# Patient Record
Sex: FEMALE | Race: WHITE | Employment: OTHER | URBAN - METROPOLITAN AREA
[De-identification: names, ages, dates, MRNs, and addresses within clinical notes are randomized per-mention and may not be internally consistent; named-entity substitution may affect disease eponyms.]

---

## 2019-04-30 ENCOUNTER — OFFICE VISIT (OUTPATIENT)
Dept: INTERNAL MEDICINE CLINIC | Facility: CLINIC | Age: 69
End: 2019-04-30
Payer: MEDICARE

## 2019-04-30 VITALS
SYSTOLIC BLOOD PRESSURE: 118 MMHG | HEIGHT: 68 IN | WEIGHT: 139 LBS | DIASTOLIC BLOOD PRESSURE: 58 MMHG | TEMPERATURE: 98 F | HEART RATE: 87 BPM | BODY MASS INDEX: 21.07 KG/M2 | OXYGEN SATURATION: 98 %

## 2019-04-30 DIAGNOSIS — E03.9 HYPOTHYROIDISM (ACQUIRED): ICD-10-CM

## 2019-04-30 DIAGNOSIS — E10.9 TYPE 1 DIABETES MELLITUS WITHOUT COMPLICATION (HCC): ICD-10-CM

## 2019-04-30 DIAGNOSIS — M81.0 AGE-RELATED OSTEOPOROSIS WITHOUT CURRENT PATHOLOGICAL FRACTURE: ICD-10-CM

## 2019-04-30 DIAGNOSIS — Z76.89 ENCOUNTER TO ESTABLISH CARE: Primary | ICD-10-CM

## 2019-04-30 DIAGNOSIS — I10 HYPERTENSION, ESSENTIAL: ICD-10-CM

## 2019-04-30 DIAGNOSIS — D51.0 PERNICIOUS ANEMIA: ICD-10-CM

## 2019-04-30 DIAGNOSIS — Z85.3 HISTORY OF BILATERAL BREAST CANCER: ICD-10-CM

## 2019-04-30 PROCEDURE — G0463 HOSPITAL OUTPT CLINIC VISIT: HCPCS | Performed by: INTERNAL MEDICINE

## 2019-04-30 PROCEDURE — 99205 OFFICE O/P NEW HI 60 MIN: CPT | Performed by: INTERNAL MEDICINE

## 2019-04-30 RX ORDER — METOPROLOL SUCCINATE 25 MG
25 TABLET, EXTENDED RELEASE 24 HR ORAL DAILY
COMMUNITY
Start: 2019-04-05 | End: 2019-04-30

## 2019-04-30 RX ORDER — ANASTROZOLE 1 MG/1
1 TABLET ORAL DAILY
COMMUNITY
Start: 2019-03-01 | End: 2020-02-21

## 2019-04-30 RX ORDER — ERGOCALCIFEROL 1.25 MG/1
50000 CAPSULE ORAL WEEKLY
Qty: 12 CAPSULE | Refills: 3 | Status: SHIPPED | OUTPATIENT
Start: 2019-04-30 | End: 2020-03-10

## 2019-04-30 RX ORDER — PEN NEEDLE, DIABETIC 31 GX5/16"
NEEDLE, DISPOSABLE MISCELLANEOUS
COMMUNITY
Start: 2018-11-14

## 2019-04-30 RX ORDER — CYANOCOBALAMIN 1000 UG/ML
1000 INJECTION INTRAMUSCULAR; SUBCUTANEOUS
COMMUNITY
Start: 2019-04-18 | End: 2020-07-06

## 2019-04-30 RX ORDER — ATORVASTATIN CALCIUM 10 MG/1
10 TABLET, FILM COATED ORAL DAILY
Qty: 90 TABLET | Refills: 3 | Status: SHIPPED | OUTPATIENT
Start: 2019-04-30 | End: 2020-02-21 | Stop reason: ALTCHOICE

## 2019-04-30 RX ORDER — LEVOTHYROXINE SODIUM 0.1 MG/1
TABLET ORAL
Qty: 90 TABLET | Refills: 3 | Status: SHIPPED | OUTPATIENT
Start: 2019-04-30 | End: 2020-06-29

## 2019-04-30 RX ORDER — LEVOTHYROXINE SODIUM 100 MCG
TABLET ORAL
COMMUNITY
Start: 2019-02-05 | End: 2019-04-30

## 2019-04-30 RX ORDER — METOPROLOL SUCCINATE 25 MG/1
25 TABLET, EXTENDED RELEASE ORAL DAILY
Qty: 90 TABLET | Refills: 3 | Status: SHIPPED | OUTPATIENT
Start: 2019-04-30 | End: 2020-06-08

## 2019-04-30 RX ORDER — ATORVASTATIN CALCIUM 10 MG/1
1 TABLET, FILM COATED ORAL DAILY
COMMUNITY
Start: 2019-02-05 | End: 2019-04-30

## 2019-04-30 NOTE — PROGRESS NOTES
Donny Milan is a 79year old female who presents for     Establish care  Recently moved back to the area from [facility], [address]. Was in [address] for 20 yrs for work. Had lived in [place] for 30 yrs before then.   Her son, a nurse practicioner,lives in [place].   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 Takes vit D 63002 u weekly. Gets calcium in diet. Refer to Dr. Sandrine Saucedo. HTN--takes toprol xl 25 mg daily. Hypercholesterolemia--takes lipitor 10 mg daily. Checks lipids.      [facility] (SYNTHROID) 100 MCG Oral Tab One tablet 6 days a week Disp: 90 tablet Rfl: 3   atorvastatin 10 MG Oral Tab Take 1 tablet (10 mg total) by mouth daily. Disp: 90 tablet Rfl: 3   anastrozole 1 MG Oral Tab tab Take 1 tablet by mouth daily.  Disp:  Rfl:    cyano

## 2019-05-16 DIAGNOSIS — E10.9 TYPE 1 DIABETES MELLITUS WITHOUT COMPLICATION (HCC): Primary | ICD-10-CM

## 2019-05-16 NOTE — TELEPHONE ENCOUNTER
[facility] requesting a new script for Test Strips  For Medicare they require the testing not have a range it must be either 4 or 5 times  Also include NPI number.     Tasked to rx

## 2019-05-21 NOTE — TELEPHONE ENCOUNTER
[facility] [place] [facility] again, when pharmacy enters for billing it shows that Medicare will only cover testing 3x per day  Please send new RX with these directions  Tasked to RX

## 2019-05-21 NOTE — TELEPHONE ENCOUNTER
I spoke with patient and she says that she is testing her blood sugar 4-5 times per day. She says she has enough glucose testing strips to last her a few days.  I spoke with pharmacist at [facility] and she says that medicare will not cover testing more than 3 lisa

## 2019-05-21 NOTE — TELEPHONE ENCOUNTER
To nursing. Tell pt Medicare won't cover test strips for more than 3 times a day even if on insulin. Not sure how I can do any changes to make medicare cover this. She will need to pay out of pocket for the other time of the day she is testing.        Trey Mejias

## 2019-05-21 NOTE — TELEPHONE ENCOUNTER
Pt is calling back she has additional questions regarding testing supplies   Please call pt [phone]    Tasked to nursing

## 2019-05-22 NOTE — TELEPHONE ENCOUNTER
Patient contacted and relayed 's message. Patient verbalized understanding but states she has secondary insurance who should cover the difference. Before she went on Medicare her insurance would cover those directions entirely.  I advised sid

## 2019-05-22 NOTE — TELEPHONE ENCOUNTER
Pt. Requests new Rx: for billing medicare B need ICD DX Code, specific qty & specific directions on Rx (**test 4 times daily**- Med part b only allows testing 1x/day if NIDDM. Max 4x/ day insulin dependent       One touch verio test strips fax.  # [id number]

## 2019-05-23 RX ORDER — BLOOD SUGAR DIAGNOSTIC
STRIP MISCELLANEOUS
Qty: 400 STRIP | Refills: 3 | Status: SHIPPED | OUTPATIENT
Start: 2019-05-23 | End: 2020-09-09

## 2019-05-28 NOTE — TELEPHONE ENCOUNTER
To [address] [address] with [facility] - they are suggesting that pt try another pharmacy since [facility] is choosing not to cover more than Medicare recommends altho Medicare will sometimes allow 4-5 times daily as below.   Pt will communicate with pt to investi

## 2019-07-05 ENCOUNTER — TELEPHONE (OUTPATIENT)
Dept: INTERNAL MEDICINE CLINIC | Facility: CLINIC | Age: 69
End: 2019-07-05

## 2019-07-10 NOTE — TELEPHONE ENCOUNTER
[facility] Detailed Order Form completed and faxed to [facility] @ [phone], conformation received. Original sent to scan.

## 2019-07-12 ENCOUNTER — TELEPHONE (OUTPATIENT)
Dept: INTERNAL MEDICINE CLINIC | Facility: CLINIC | Age: 69
End: 2019-07-12

## 2019-07-12 ENCOUNTER — LAB ENCOUNTER (OUTPATIENT)
Dept: LAB | Age: 69
End: 2019-07-12
Attending: INTERNAL MEDICINE
Payer: MEDICARE

## 2019-07-12 DIAGNOSIS — E10.9 TYPE 1 DIABETES MELLITUS WITHOUT COMPLICATION (HCC): ICD-10-CM

## 2019-07-12 DIAGNOSIS — M81.0 AGE-RELATED OSTEOPOROSIS WITHOUT CURRENT PATHOLOGICAL FRACTURE: ICD-10-CM

## 2019-07-12 LAB
25(OH)D3 SERPL-MCNC: 57.5 NG/ML (ref 30–100)
ALBUMIN SERPL-MCNC: 3.3 G/DL (ref 3.4–5)
ALBUMIN/GLOB SERPL: 0.8 {RATIO} (ref 1–2)
ALP LIVER SERPL-CCNC: 156 U/L (ref 55–142)
ALT SERPL-CCNC: 65 U/L (ref 13–56)
ANION GAP SERPL CALC-SCNC: 9 MMOL/L (ref 0–18)
AST SERPL-CCNC: 95 U/L (ref 15–37)
BASOPHILS # BLD AUTO: 0.05 X10(3) UL (ref 0–0.2)
BASOPHILS NFR BLD AUTO: 1.3 %
BILIRUB SERPL-MCNC: 1 MG/DL (ref 0.1–2)
BILIRUB UR QL: NEGATIVE
BUN BLD-MCNC: 6 MG/DL (ref 7–18)
BUN/CREAT SERPL: 7.8 (ref 10–20)
CALCIUM BLD-MCNC: 9.6 MG/DL (ref 8.5–10.1)
CHLORIDE SERPL-SCNC: 99 MMOL/L (ref 98–112)
CHOLEST SMN-MCNC: 164 MG/DL (ref ?–200)
CO2 SERPL-SCNC: 27 MMOL/L (ref 21–32)
COLOR UR: YELLOW
CREAT BLD-MCNC: 0.77 MG/DL (ref 0.55–1.02)
CREAT UR-SCNC: 158 MG/DL
DEPRECATED RDW RBC AUTO: 46 FL (ref 35.1–46.3)
EOSINOPHIL # BLD AUTO: 0.07 X10(3) UL (ref 0–0.7)
EOSINOPHIL NFR BLD AUTO: 1.9 %
ERYTHROCYTE [DISTWIDTH] IN BLOOD BY AUTOMATED COUNT: 12.4 % (ref 11–15)
EST. AVERAGE GLUCOSE BLD GHB EST-MCNC: 100 MG/DL (ref 68–126)
GLOBULIN PLAS-MCNC: 4.1 G/DL (ref 2.8–4.4)
GLUCOSE BLD-MCNC: 208 MG/DL (ref 70–99)
GLUCOSE UR-MCNC: NEGATIVE MG/DL
HBA1C MFR BLD HPLC: 5.1 % (ref ?–5.7)
HCT VFR BLD AUTO: 40.7 % (ref 35–48)
HDLC SERPL-MCNC: 91 MG/DL (ref 40–59)
HGB BLD-MCNC: 13.5 G/DL (ref 12–16)
HYALINE CASTS #/AREA URNS AUTO: 2 /LPF
IMM GRANULOCYTES # BLD AUTO: 0.01 X10(3) UL (ref 0–1)
IMM GRANULOCYTES NFR BLD: 0.3 %
LDLC SERPL CALC-MCNC: 48 MG/DL (ref ?–100)
LYMPHOCYTES # BLD AUTO: 0.67 X10(3) UL (ref 1–4)
LYMPHOCYTES NFR BLD AUTO: 17.9 %
M PROTEIN MFR SERPL ELPH: 7.4 G/DL (ref 6.4–8.2)
MCH RBC QN AUTO: 34.1 PG (ref 26–34)
MCHC RBC AUTO-ENTMCNC: 33.2 G/DL (ref 31–37)
MCV RBC AUTO: 102.8 FL (ref 80–100)
MICROALBUMIN UR-MCNC: 1.69 MG/DL
MICROALBUMIN/CREAT 24H UR-RTO: 10.7 UG/MG (ref ?–30)
MONOCYTES # BLD AUTO: 0.3 X10(3) UL (ref 0.1–1)
MONOCYTES NFR BLD AUTO: 8 %
NEUTROPHILS # BLD AUTO: 2.65 X10 (3) UL (ref 1.5–7.7)
NEUTROPHILS # BLD AUTO: 2.65 X10(3) UL (ref 1.5–7.7)
NEUTROPHILS NFR BLD AUTO: 70.6 %
NITRITE UR QL STRIP.AUTO: NEGATIVE
NONHDLC SERPL-MCNC: 73 MG/DL (ref ?–130)
OSMOLALITY SERPL CALC.SUM OF ELEC: 284 MOSM/KG (ref 275–295)
PATIENT FASTING: YES
PATIENT FASTING: YES
PH UR: 5 [PH] (ref 5–8)
PLATELET # BLD AUTO: 234 10(3)UL (ref 150–450)
POTASSIUM SERPL-SCNC: 4.4 MMOL/L (ref 3.5–5.1)
PROT UR-MCNC: NEGATIVE MG/DL
RBC # BLD AUTO: 3.96 X10(6)UL (ref 3.8–5.3)
RBC #/AREA URNS AUTO: 3 /HPF
SODIUM SERPL-SCNC: 135 MMOL/L (ref 136–145)
SP GR UR STRIP: 1.01 (ref 1–1.03)
TRIGL SERPL-MCNC: 127 MG/DL (ref 30–149)
TSI SER-ACNC: 1.06 MIU/ML (ref 0.36–3.74)
UROBILINOGEN UR STRIP-ACNC: <2
VIT C UR-MCNC: NEGATIVE MG/DL
VLDLC SERPL CALC-MCNC: 25 MG/DL (ref 0–30)
WBC # BLD AUTO: 3.8 X10(3) UL (ref 4–11)
WBC #/AREA URNS AUTO: 67 /HPF

## 2019-07-12 PROCEDURE — 36415 COLL VENOUS BLD VENIPUNCTURE: CPT

## 2019-07-12 PROCEDURE — 80053 COMPREHEN METABOLIC PANEL: CPT

## 2019-07-12 PROCEDURE — 87077 CULTURE AEROBIC IDENTIFY: CPT | Performed by: INTERNAL MEDICINE

## 2019-07-12 PROCEDURE — 87186 SC STD MICRODIL/AGAR DIL: CPT | Performed by: INTERNAL MEDICINE

## 2019-07-12 PROCEDURE — 82043 UR ALBUMIN QUANTITATIVE: CPT

## 2019-07-12 PROCEDURE — 80061 LIPID PANEL: CPT

## 2019-07-12 PROCEDURE — 83036 HEMOGLOBIN GLYCOSYLATED A1C: CPT

## 2019-07-12 PROCEDURE — 87086 URINE CULTURE/COLONY COUNT: CPT | Performed by: INTERNAL MEDICINE

## 2019-07-12 PROCEDURE — 82570 ASSAY OF URINE CREATININE: CPT

## 2019-07-12 PROCEDURE — 85025 COMPLETE CBC W/AUTO DIFF WBC: CPT

## 2019-07-12 PROCEDURE — 84443 ASSAY THYROID STIM HORMONE: CPT

## 2019-07-12 PROCEDURE — 81001 URINALYSIS AUTO W/SCOPE: CPT | Performed by: INTERNAL MEDICINE

## 2019-07-12 PROCEDURE — 82306 VITAMIN D 25 HYDROXY: CPT

## 2019-07-12 NOTE — TELEPHONE ENCOUNTER
I spoke with patient and she is not having any concerning urinary symptoms. Urine culture is pending. Dr. Reji Cho, ok to wait for Dr. Silvano Ortiz?

## 2019-07-17 ENCOUNTER — TELEPHONE (OUTPATIENT)
Dept: INTERNAL MEDICINE CLINIC | Facility: CLINIC | Age: 69
End: 2019-07-17

## 2019-07-17 NOTE — TELEPHONE ENCOUNTER
To nursing, please tell patient lab tests done on 7/12/19 show   1) she has a urinary tract infection. Rx Macrobid 100 mg twice daily #14.    2) elevated liver enzymes similar to what she had in [place] lab on 9/28/18.   Please hold the atorvastatin for

## 2019-07-18 RX ORDER — NITROFURANTOIN 25; 75 MG/1; MG/1
100 CAPSULE ORAL 2 TIMES DAILY
Qty: 14 CAPSULE | Refills: 0 | Status: SHIPPED | OUTPATIENT
Start: 2019-07-18 | End: 2019-07-30

## 2019-07-18 NOTE — TELEPHONE ENCOUNTER
macrobid eRX sent to [facility]. Spoke to patient and notified her that medication was sent. She verbalized understanding.

## 2019-07-18 NOTE — TELEPHONE ENCOUNTER
Called patient and relayed Dr Hong Cee message to her. Patient did verbalize understanding. She does not use a local pharmacy because she does not have a car.   She will speak with her daughter to find out at what pharmacy she could  the medicin

## 2019-07-18 NOTE — TELEPHONE ENCOUNTER
Pt is calling back to give us the pharmacy she would like to use:    Pharmacy: [facility]  Address: [address] [place] [address], [place] [address]  Phone # [phone]

## 2019-07-25 ENCOUNTER — OFFICE VISIT (OUTPATIENT)
Dept: ENDOCRINOLOGY CLINIC | Facility: CLINIC | Age: 69
End: 2019-07-25
Payer: MEDICARE

## 2019-07-25 VITALS
HEART RATE: 82 BPM | WEIGHT: 135 LBS | SYSTOLIC BLOOD PRESSURE: 126 MMHG | DIASTOLIC BLOOD PRESSURE: 75 MMHG | BODY MASS INDEX: 21 KG/M2

## 2019-07-25 DIAGNOSIS — M81.0 AGE-RELATED OSTEOPOROSIS WITHOUT CURRENT PATHOLOGICAL FRACTURE: ICD-10-CM

## 2019-07-25 DIAGNOSIS — E10.9 CONTROLLED DIABETES MELLITUS TYPE 1 WITHOUT COMPLICATIONS (HCC): Primary | ICD-10-CM

## 2019-07-25 PROCEDURE — 99204 OFFICE O/P NEW MOD 45 MIN: CPT | Performed by: INTERNAL MEDICINE

## 2019-07-25 NOTE — PROGRESS NOTES
Name: Hussain Cosme  Date: 7/25/2019    Referring Physician: No ref. provider found    HISTORY OF PRESENT ILLNESS   Hussain Cosme is a 79year old female who presents for diabetes mellitus type 1 diagnosed at age 17.      Prior HbA, C or glycohemoglobin wer by mouth 2 (two) times daily. , Disp: 14 capsule, Rfl: 0  •  Glucose Blood (ONETOUCH VERIO) In Vitro Strip, Test blood sugar 4 times daily. , Disp: 400 strip, Rfl: 3  •  anastrozole 1 MG Oral Tab tab, Take 1 tablet by mouth daily. , Disp: , Rfl:   •  cyanocob 2017    bilat mastectomy, chemo, radiation to both breasts in [place] -Dr Iram Still.     • Hypercholesterolemia    • Hypertension    • Hypothyroidism age 14    hashimoto's   • Osteoarthritis    • Osteoporosis 2005   • Pernicious anemia 1980s    self cardiovascular disease  -Discussed importance of SBGM  -Discussed importance of low CHO diet, recommend 45gm per meal or 135gm per day  -Provided patient education materials  -Concerned about risk of hypoglycemia particularly given low A1c but patient stat

## 2019-07-30 ENCOUNTER — OFFICE VISIT (OUTPATIENT)
Dept: INTERNAL MEDICINE CLINIC | Facility: CLINIC | Age: 69
End: 2019-07-30
Payer: MEDICARE

## 2019-07-30 ENCOUNTER — APPOINTMENT (OUTPATIENT)
Dept: LAB | Age: 69
End: 2019-07-30
Attending: INTERNAL MEDICINE
Payer: MEDICARE

## 2019-07-30 VITALS
TEMPERATURE: 99 F | SYSTOLIC BLOOD PRESSURE: 96 MMHG | HEART RATE: 89 BPM | WEIGHT: 135.25 LBS | DIASTOLIC BLOOD PRESSURE: 56 MMHG | HEIGHT: 68 IN | OXYGEN SATURATION: 98 % | BODY MASS INDEX: 20.5 KG/M2

## 2019-07-30 DIAGNOSIS — R79.89 ELEVATED LFTS: ICD-10-CM

## 2019-07-30 DIAGNOSIS — K76.9 LIVER DISEASE: ICD-10-CM

## 2019-07-30 DIAGNOSIS — E78.00 HYPERCHOLESTEROLEMIA: ICD-10-CM

## 2019-07-30 DIAGNOSIS — B96.89 UTI DUE TO KLEBSIELLA SPECIES: ICD-10-CM

## 2019-07-30 DIAGNOSIS — E10.9 TYPE 1 DIABETES MELLITUS WITHOUT COMPLICATION (HCC): ICD-10-CM

## 2019-07-30 DIAGNOSIS — I10 HYPERTENSION, ESSENTIAL: Primary | ICD-10-CM

## 2019-07-30 DIAGNOSIS — N39.0 UTI DUE TO KLEBSIELLA SPECIES: ICD-10-CM

## 2019-07-30 LAB
HBV SURFACE AG SER-ACNC: 0.41 [IU]/L
HBV SURFACE AG SERPL QL IA: NONREACTIVE
HCV AB SERPL QL IA: NONREACTIVE
IRON SATURATION: 47 % (ref 15–50)
IRON SERPL-MCNC: 95 UG/DL (ref 50–170)
TOTAL IRON BINDING CAPACITY: 204 UG/DL (ref 240–450)
TRANSFERRIN SERPL-MCNC: 137 MG/DL (ref 200–360)

## 2019-07-30 PROCEDURE — G0463 HOSPITAL OUTPT CLINIC VISIT: HCPCS | Performed by: INTERNAL MEDICINE

## 2019-07-30 PROCEDURE — 86256 FLUORESCENT ANTIBODY TITER: CPT

## 2019-07-30 PROCEDURE — 99214 OFFICE O/P EST MOD 30 MIN: CPT | Performed by: INTERNAL MEDICINE

## 2019-07-30 PROCEDURE — 82390 ASSAY OF CERULOPLASMIN: CPT

## 2019-07-30 PROCEDURE — 86255 FLUORESCENT ANTIBODY SCREEN: CPT

## 2019-07-30 PROCEDURE — 87340 HEPATITIS B SURFACE AG IA: CPT

## 2019-07-30 PROCEDURE — 86803 HEPATITIS C AB TEST: CPT

## 2019-07-30 PROCEDURE — 36415 COLL VENOUS BLD VENIPUNCTURE: CPT

## 2019-07-30 PROCEDURE — 83540 ASSAY OF IRON: CPT

## 2019-07-30 PROCEDURE — 84466 ASSAY OF TRANSFERRIN: CPT

## 2019-07-30 NOTE — PROGRESS NOTES
Leeann Palumbo is a 79year old female who presents for     3-month check    Type I diabetic since age 17. Saw Dr. Juan C Quiroga for initial visit 7/25/19. A1c 5.1%  She recommended continue Lantus 10-12 units daily & Humalog 2-7 units AC.    Has Dexcom G5--not ye Former Smoker        Packs/day: 0.25        Years: 46.00        Pack years: 11.5        Types: Cigarettes        Start date: 1972        Quit date: 2018        Years since quittin.6      Smokeless tobacco: Never Used    Alcohol use:  Yes fe/tibc, AMA, ASMA. Ceruloplasmin. recom see GI--Dr Sara Johnson and assoc. Type 1 diabetes mellitus w/o complication--takes Lantus 10-12 u/d and humalog 2-7 units AC. Seeing Dr Luis Ravi since 7/25/19. A1c 5.1 on 7/12/19.   Was referred at 4/30/19 visit to  sugar 4 times daily. Disp: 400 strip Rfl: 3   anastrozole 1 MG Oral Tab tab Take 1 tablet by mouth daily. Disp:  Rfl:    cyanocobalamin 1000 MCG/ML Injection Solution Inject 1,000 mcg into the skin every 14 (fourteen) days.  Disp:  Rfl:    BD PEN NEEDLE LEXUS

## 2019-07-31 LAB — CERULOPLASMIN SERPL-MCNC: 24.7 MG/DL (ref 20–60)

## 2019-08-02 LAB
MITOCHONDRIA AB TITR SER: <20 {TITER}
SMOOTH MUSCLE AB TITR SER: <20 {TITER}

## 2019-08-05 ENCOUNTER — TELEPHONE (OUTPATIENT)
Dept: ENDOCRINOLOGY CLINIC | Facility: CLINIC | Age: 69
End: 2019-08-05

## 2019-08-05 NOTE — TELEPHONE ENCOUNTER
Received SOB for prolia. PA is needed for secondary insurance. Pt is to owe 20% of cost.     Faxed over completed PA form to Dina Peterson. Waiting for reply.

## 2019-08-07 ENCOUNTER — TELEPHONE (OUTPATIENT)
Dept: INTERNAL MEDICINE CLINIC | Facility: CLINIC | Age: 69
End: 2019-08-07

## 2019-08-07 NOTE — TELEPHONE ENCOUNTER
I spoke with patient and relayed Dr. Brandon Neither message. She verbalized understanding. Invited patient to call the office if she has any questions or concerns.

## 2019-08-07 NOTE — TELEPHONE ENCOUNTER
To nursing,   please tell patient   lab done 7/30/19–results are okay in regards to additional lab done in view of her elevated liver function tests.     Please proceed as we discussed with ultrasound of the liver and also seeing Dr. Warren Tanner or associ

## 2019-08-27 NOTE — TELEPHONE ENCOUNTER
[employer] plan. Rep confirms that no PA is required with Medicare as primary insurance. Call ref # [id number].

## 2019-09-23 ENCOUNTER — TELEPHONE (OUTPATIENT)
Dept: ENDOCRINOLOGY CLINIC | Facility: CLINIC | Age: 69
End: 2019-09-23

## 2019-09-23 NOTE — TELEPHONE ENCOUNTER
pls call [employer] [facility] [phone] re pts order for supplies , sensor, strips     Pt states they sent over paperwork for Dr to fill out weeks ago - not sure what number they faxed it to

## 2019-10-01 ENCOUNTER — NURSE ONLY (OUTPATIENT)
Dept: ENDOCRINOLOGY CLINIC | Facility: CLINIC | Age: 69
End: 2019-10-01
Payer: MEDICARE

## 2019-10-01 DIAGNOSIS — M81.8 OTHER OSTEOPOROSIS, UNSPECIFIED PATHOLOGICAL FRACTURE PRESENCE: Primary | ICD-10-CM

## 2019-10-01 PROCEDURE — 96372 THER/PROPH/DIAG INJ SC/IM: CPT | Performed by: INTERNAL MEDICINE

## 2019-10-01 NOTE — PROGRESS NOTES
Patient presents to clinic today for Prolia (denosumab) 60 mg/mL in left upper arm. Patient tolerated well. Pt understands to return in 6 months for next injection.

## 2019-10-24 ENCOUNTER — OFFICE VISIT (OUTPATIENT)
Dept: ENDOCRINOLOGY CLINIC | Facility: CLINIC | Age: 69
End: 2019-10-24
Payer: MEDICARE

## 2019-10-24 VITALS
WEIGHT: 128.63 LBS | DIASTOLIC BLOOD PRESSURE: 62 MMHG | SYSTOLIC BLOOD PRESSURE: 110 MMHG | HEART RATE: 89 BPM | BODY MASS INDEX: 20 KG/M2

## 2019-10-24 DIAGNOSIS — M81.8 OTHER OSTEOPOROSIS WITHOUT CURRENT PATHOLOGICAL FRACTURE: ICD-10-CM

## 2019-10-24 DIAGNOSIS — E10.9 CONTROLLED DIABETES MELLITUS TYPE 1 WITHOUT COMPLICATIONS (HCC): Primary | ICD-10-CM

## 2019-10-24 DIAGNOSIS — E55.9 VITAMIN D DEFICIENCY: ICD-10-CM

## 2019-10-24 PROCEDURE — 82962 GLUCOSE BLOOD TEST: CPT | Performed by: INTERNAL MEDICINE

## 2019-10-24 PROCEDURE — 36416 COLLJ CAPILLARY BLOOD SPEC: CPT | Performed by: INTERNAL MEDICINE

## 2019-10-24 PROCEDURE — 83036 HEMOGLOBIN GLYCOSYLATED A1C: CPT | Performed by: INTERNAL MEDICINE

## 2019-10-24 PROCEDURE — 99214 OFFICE O/P EST MOD 30 MIN: CPT | Performed by: INTERNAL MEDICINE

## 2019-10-24 NOTE — PROGRESS NOTES
Name: Nuvia Waddell  Date: 10/24/2019    Referring Physician: No ref. provider found    HISTORY OF PRESENT ILLNESS   Nuvia Waddell is a 81year old female who presents for diabetes mellitus type 1 diagnosed at age 17.      Prior HbA, C or glycohemoglobin we Rfl:   •  Glucose Blood (ONETOUCH VERIO) In Vitro Strip, Test blood sugar 4 times daily. , Disp: 400 strip, Rfl: 3  •  anastrozole 1 MG Oral Tab tab, Take 1 tablet by mouth daily. , Disp: , Rfl:   •  cyanocobalamin 1000 MCG/ML Injection Solution, Inject 1,00 chemo, radiation to both breasts in [place] -Dr Aidee Araujo.     • Hypercholesterolemia    • Hypertension    • Hypothyroidism age 14    hashimoto's   • Osteoarthritis    • Osteoporosis 2005   • Pernicious anemia 1980s    self administers B12 shots twi disease  -Discussed importance of SBGM  -Discussed importance of low CHO diet, recommend 45gm per meal or 135gm per day  -Provided patient education materials  -Overall sugars are improved with CGM   -Continue Lantus 11 units SQ QHS  -Continue current dose

## 2019-10-25 ENCOUNTER — HOSPITAL ENCOUNTER (OUTPATIENT)
Dept: ULTRASOUND IMAGING | Facility: HOSPITAL | Age: 69
Discharge: HOME | End: 2019-10-25
Attending: INTERNAL MEDICINE
Payer: MEDICARE

## 2019-10-25 DIAGNOSIS — R79.89 ELEVATED LFTS: ICD-10-CM

## 2019-10-25 PROCEDURE — 76705 ECHO EXAM OF ABDOMEN: CPT | Performed by: INTERNAL MEDICINE

## 2019-10-29 ENCOUNTER — TELEPHONE (OUTPATIENT)
Dept: INTERNAL MEDICINE CLINIC | Facility: CLINIC | Age: 69
End: 2019-10-29

## 2019-10-29 NOTE — TELEPHONE ENCOUNTER
To nursing, please tell patient ultrasound liver 10/25/19 shows fatty liver and gallstones. Please keep appointment with me tomorrow 10/30/19 and will discuss further. Thanks.     Note to self–US liver 10/25/19 hepatic steatosis, gallstones, 6 mm angie

## 2019-10-29 NOTE — TELEPHONE ENCOUNTER
Spoke to patient and relayed MD message, she verbalizes understanding and agrees to keep appt for tomorrow.

## 2019-10-30 ENCOUNTER — OFFICE VISIT (OUTPATIENT)
Dept: INTERNAL MEDICINE CLINIC | Facility: CLINIC | Age: 69
End: 2019-10-30
Payer: MEDICARE

## 2019-10-30 VITALS
DIASTOLIC BLOOD PRESSURE: 66 MMHG | TEMPERATURE: 98 F | HEART RATE: 92 BPM | WEIGHT: 125 LBS | HEIGHT: 68 IN | BODY MASS INDEX: 18.94 KG/M2 | SYSTOLIC BLOOD PRESSURE: 126 MMHG

## 2019-10-30 DIAGNOSIS — I10 HYPERTENSION, ESSENTIAL: Primary | ICD-10-CM

## 2019-10-30 DIAGNOSIS — K80.20 GALLSTONES: ICD-10-CM

## 2019-10-30 DIAGNOSIS — R79.89 ELEVATED LFTS: ICD-10-CM

## 2019-10-30 DIAGNOSIS — E78.00 HYPERCHOLESTEROLEMIA: ICD-10-CM

## 2019-10-30 DIAGNOSIS — E10.9 TYPE 1 DIABETES MELLITUS WITHOUT COMPLICATION (HCC): ICD-10-CM

## 2019-10-30 DIAGNOSIS — K76.0 FATTY LIVER: ICD-10-CM

## 2019-10-30 PROCEDURE — G0009 ADMIN PNEUMOCOCCAL VACCINE: HCPCS | Performed by: INTERNAL MEDICINE

## 2019-10-30 PROCEDURE — G0008 ADMIN INFLUENZA VIRUS VAC: HCPCS | Performed by: INTERNAL MEDICINE

## 2019-10-30 PROCEDURE — 99214 OFFICE O/P EST MOD 30 MIN: CPT | Performed by: INTERNAL MEDICINE

## 2019-10-30 PROCEDURE — G0463 HOSPITAL OUTPT CLINIC VISIT: HCPCS | Performed by: INTERNAL MEDICINE

## 2019-10-30 PROCEDURE — 90662 IIV NO PRSV INCREASED AG IM: CPT | Performed by: INTERNAL MEDICINE

## 2019-10-30 PROCEDURE — 90670 PCV13 VACCINE IM: CPT | Performed by: INTERNAL MEDICINE

## 2019-10-30 NOTE — PROGRESS NOTES
Nuvia Waddell is a 81year old female who presents for     3-month check    Type I diabetic - Saw Dr. Escobar Mendez for f/u 10/24/19. A1c 6.0%  continue Lantus 10-12 units daily & Humalog 4-6 units AC. Using Dexcom G5 since late 8/2019.      HTN- toprol xl 25 mg d  early [de-identified].    • Other (1 brother, 1 son, 2 daughters) Other       Social History:   Social History    Tobacco Use      Smoking status: Former Smoker        Packs/day: 0.25        Years: 46.00        Pack years: 11.5        Types: Cigarettes restarting lipitor. Elevated LFTs-fatty liver--gallstones asymptomatc  -alt 65 ast 95 Alk ph156 on 7/12/19. [place] 9/28/18–similar elev LFTs and per pt for 10 yrs before.    7/30/19–HCV, hep Bs Ag, FE/TIBC, AMA, anti-smooth musc Ab, ceruloplasmin-o minute visit and greater than 50% of the time was spent counseling the patient and/or coordinating care. Her son, a nurse practicioner,lives in [facility]; Future  - COMP METABOLIC PANEL (14);  Future        Insulin Glargine (LANTUS SOLO

## 2019-11-07 ENCOUNTER — TELEPHONE (OUTPATIENT)
Dept: ENDOCRINOLOGY CLINIC | Facility: CLINIC | Age: 69
End: 2019-11-07

## 2019-11-07 NOTE — TELEPHONE ENCOUNTER
LOV 10/24/19. Notes indicate to continue lantus 11 units at HS. Spoke with pt who reports she uses up to 13 units per night and would like rx written this way. SH please advise on directions and quantity.

## 2019-11-07 NOTE — TELEPHONE ENCOUNTER
Patient requesting 90 days supply for Lantus insulin. [facility] states based on dose she only needs one box. Please call to discuss. Thank you.     Current Outpatient Medications   Medication Sig Dispense Refill   • Insulin Glargine (LANTUS SOLOSTA

## 2019-11-14 ENCOUNTER — TELEPHONE (OUTPATIENT)
Dept: ENDOCRINOLOGY CLINIC | Facility: CLINIC | Age: 69
End: 2019-11-14

## 2019-11-14 NOTE — TELEPHONE ENCOUNTER
[facility] has questions regarding Lantus insulin instructions. Please call with ref# [id number]. Thank you.

## 2020-02-21 ENCOUNTER — OFFICE VISIT (OUTPATIENT)
Dept: HEMATOLOGY/ONCOLOGY | Facility: HOSPITAL | Age: 70
End: 2020-02-21
Attending: INTERNAL MEDICINE
Payer: MEDICARE

## 2020-02-21 VITALS
DIASTOLIC BLOOD PRESSURE: 65 MMHG | HEART RATE: 82 BPM | HEIGHT: 68 IN | RESPIRATION RATE: 16 BRPM | WEIGHT: 139 LBS | BODY MASS INDEX: 21.07 KG/M2 | SYSTOLIC BLOOD PRESSURE: 137 MMHG | OXYGEN SATURATION: 97 % | TEMPERATURE: 98 F

## 2020-02-21 DIAGNOSIS — Z78.0 ASYMPTOMATIC MENOPAUSAL STATE: ICD-10-CM

## 2020-02-21 DIAGNOSIS — C50.212 MALIGNANT NEOPLASM OF UPPER-INNER QUADRANT OF LEFT BREAST IN FEMALE, ESTROGEN RECEPTOR POSITIVE (HCC): Primary | ICD-10-CM

## 2020-02-21 DIAGNOSIS — M85.80 OSTEOPENIA DUE TO CANCER THERAPY: ICD-10-CM

## 2020-02-21 DIAGNOSIS — Z17.0 MALIGNANT NEOPLASM OF UPPER-INNER QUADRANT OF LEFT BREAST IN FEMALE, ESTROGEN RECEPTOR POSITIVE (HCC): Primary | ICD-10-CM

## 2020-02-21 PROCEDURE — 99204 OFFICE O/P NEW MOD 45 MIN: CPT | Performed by: INTERNAL MEDICINE

## 2020-02-21 RX ORDER — ANASTROZOLE 1 MG/1
1 TABLET ORAL DAILY
Qty: 90 TABLET | Refills: 3 | Status: ON HOLD | OUTPATIENT
Start: 2020-02-21 | End: 2020-09-14

## 2020-02-21 NOTE — PROGRESS NOTES
JESSE Streeter is a 81year old female here to establish care for Malignant neoplasm of upper-inner quadrant of left breast in female, estrogen receptor positive (hcc)  (primary encounter diagnosis)    Her medical oncology records from her cancer ca regular follow-ups with her oncologist in [place] and has been followed closely with DEXA scans due to osteopenia, when he has had some fractures due to falls.   The patient was also started on Zometa due to her bone density issues, as well as per note fro gait problem (uses walker, has had falls.) and neck pain. Negative for back pain. Skin: Negative for rash. Dry skin. Neurological: Positive for gait problem (uses walker, has had falls.) and numbness. Negative for dizziness and headaches.    Hema anemia 1980s    self administers B12 shots twice a month per Dr Evins Lefort at [facility]   • Type 1 diabetes [facility]) age 17     Past Surgical History:   Procedure Laterality Date   • ANKLE FRACTURE SURGERY Right 08/2018    in [place]. (plate placed).  Dr Naldo [place] regions.   Psych/Depression: nl        ASSESSMENT/PLAN:   Malignant neoplasm of upper-inner quadrant of left breast in female, estrogen receptor positive (hcc)  (primary encounter diagnosis)    Cancer Staging  Malignant neoplasm of upper-inner quadrant of l 8/21/2020) for surveillance, monitoring medication, follow-up. No orders of the defined types were placed in this encounter. Results From Past 48 Hours:  No results found for this or any previous visit (from the past 48 hour(s)).       Imaging & R

## 2020-03-02 ENCOUNTER — OFFICE VISIT (OUTPATIENT)
Dept: INTERNAL MEDICINE CLINIC | Facility: CLINIC | Age: 70
End: 2020-03-02
Payer: MEDICARE

## 2020-03-02 VITALS
WEIGHT: 140 LBS | DIASTOLIC BLOOD PRESSURE: 60 MMHG | HEIGHT: 68 IN | TEMPERATURE: 98 F | HEART RATE: 88 BPM | SYSTOLIC BLOOD PRESSURE: 110 MMHG | BODY MASS INDEX: 21.22 KG/M2

## 2020-03-02 DIAGNOSIS — K76.0 FATTY LIVER: ICD-10-CM

## 2020-03-02 DIAGNOSIS — I10 HYPERTENSION, ESSENTIAL: Primary | ICD-10-CM

## 2020-03-02 DIAGNOSIS — E10.9 TYPE 1 DIABETES MELLITUS WITHOUT COMPLICATION (HCC): ICD-10-CM

## 2020-03-02 DIAGNOSIS — R79.89 ELEVATED LFTS: ICD-10-CM

## 2020-03-02 DIAGNOSIS — E78.00 HYPERCHOLESTEROLEMIA: ICD-10-CM

## 2020-03-02 PROCEDURE — G0463 HOSPITAL OUTPT CLINIC VISIT: HCPCS | Performed by: INTERNAL MEDICINE

## 2020-03-02 PROCEDURE — 99214 OFFICE O/P EST MOD 30 MIN: CPT | Performed by: INTERNAL MEDICINE

## 2020-03-02 NOTE — PROGRESS NOTES
Nuvia Waddell is a 81year old female who presents for     4-month check    Type I diabetic - Saw Dr. Escobar Mendez for f/u 10/24/19. A1c 6.0%  continue Lantus 10-12 units daily & Humalog 4-6 units AC. Using Dexcom G5 since late 8/2019. \"my sugars are doing ok. \  age 82 CHF   • Colon Cancer Mother 66   • Other (parkinsons disease) Father          early [de-identified].    • Other (1 brother, 1 son, 2 daughters) Other       Social History:   Social History    Tobacco Use      Smoking status: Former Smoker        Packs/d AST 95 alk phos 156 on 7/12/19 lab. LDL cholesterol 48, HDL 91. Reminder to do lipids off lipitor (Lipid cmp orders still valid) and see what GI/ hepatology says before restarting lipitor.      Elevated LFTs-fatty liver--gallstones asymptomatic.  alt 65 anti-smooth musc Ab, ceruloplasmin-ok. Reminder to do lipid cmp.      Ret in 4 mo. Patient expresses understanding of above issues and plan.    This is a 25 minute visit and greater than 50% of the time was spent counseling the patient and/or coordin

## 2020-03-05 ENCOUNTER — TELEPHONE (OUTPATIENT)
Dept: ENDOCRINOLOGY CLINIC | Facility: CLINIC | Age: 70
End: 2020-03-05

## 2020-03-05 NOTE — TELEPHONE ENCOUNTER
Pt due for Prolia on/after 4/2/2020. Has a follow up scheduled on 4/27/2020. Labs are ordered per protocol. Prolia IV faxed to [employer]. Awaiting SOB.

## 2020-03-10 RX ORDER — ERGOCALCIFEROL 1.25 MG/1
CAPSULE ORAL
Qty: 12 CAPSULE | Refills: 3 | Status: SHIPPED | OUTPATIENT
Start: 2020-03-10 | End: 2021-01-21

## 2020-03-11 NOTE — TELEPHONE ENCOUNTER
Per chart review, last Prolia IV came back needing PA but when resent with Medicare as primary, no PA needed. Resubmitted Prolia IV through [employer], awaiting decision.

## 2020-04-21 NOTE — TELEPHONE ENCOUNTER
Pt called for refills on Dexcom Sensors and also Humalog/[facility]:     Current Outpatient Medications:     •  Insulin Lispro (HUMALOG KWIKPEN) 100 UNIT/ML Subcutaneous Solution Pen-injector, Inject 4-6 units before meals.  , Disp: 5 pen, Rfl: 3    Pl [Dear  ___] : Dear  [unfilled], [Consult Letter:] : I had the pleasure of evaluating your patient, [unfilled]. [Please see my note below.] : Please see my note below. [Consult Closing:] : Thank you very much for allowing me to participate in the care of this patient.  If you have any questions, please do not hesitate to contact me. [Sincerely,] : Sincerely, [FreeTextEntry3] : Yahir Joe M.D., Ph.D. DPN-N\par [facility]\par Neurology at [place]\par Medical Director of Stroke Services\par [facility]\par

## 2020-04-21 NOTE — TELEPHONE ENCOUNTER
Pended rx for provider. Dr. Malathi Smith please advise - on your paper schedule for 4/27 - there is no TeleHealth or Virtual Visit option marked next to patients name. Would you like to make patients appt a TH or VV?  Thanks

## 2020-04-22 RX ORDER — INSULIN LISPRO 100 [IU]/ML
INJECTION, SOLUTION INTRAVENOUS; SUBCUTANEOUS
Qty: 15 ML | Refills: 3 | Status: SHIPPED | OUTPATIENT
Start: 2020-04-22 | End: 2020-07-06

## 2020-04-22 RX ORDER — BLOOD-GLUCOSE SENSOR
EACH MISCELLANEOUS
Qty: 3 EACH | Refills: 3 | Status: SHIPPED | OUTPATIENT
Start: 2020-04-22 | End: 2020-10-07

## 2020-04-24 ENCOUNTER — TELEPHONE (OUTPATIENT)
Dept: ENDOCRINOLOGY CLINIC | Facility: CLINIC | Age: 70
End: 2020-04-24

## 2020-04-27 ENCOUNTER — VIRTUAL PHONE E/M (OUTPATIENT)
Dept: ENDOCRINOLOGY CLINIC | Facility: CLINIC | Age: 70
End: 2020-04-27
Payer: MEDICARE

## 2020-04-27 ENCOUNTER — TELEPHONE (OUTPATIENT)
Dept: ENDOCRINOLOGY CLINIC | Facility: CLINIC | Age: 70
End: 2020-04-27

## 2020-04-27 DIAGNOSIS — E10.9 CONTROLLED DIABETES MELLITUS TYPE 1 WITHOUT COMPLICATIONS (HCC): Primary | ICD-10-CM

## 2020-04-27 PROCEDURE — 99442 PHONE E/M BY PHYS 11-20 MIN: CPT | Performed by: INTERNAL MEDICINE

## 2020-04-27 NOTE — TELEPHONE ENCOUNTER
Please call [employer] to give refill for sensors. Thank you. Also send [employer] message to patient after you speak to [facility].

## 2020-04-27 NOTE — TELEPHONE ENCOUNTER
Called [facility], will fax request sheet. Once received information, will fax to [facility] so pt can get sensor refills.

## 2020-04-27 NOTE — TELEPHONE ENCOUNTER
Faxed requested information to [facility] [phone]. Called pt to notify to call [facility] to request refills.

## 2020-04-27 NOTE — PROGRESS NOTES
Virtual Telephone Check-In    [facility] verbally consents to a Virtual/Telephone Check-In visit on 04/27/20. Patient understands and accepts financial responsibility for any deductible, co-insurance and/or co-pays associated with this service.     Du 10/2019    Thyroid disease: Yes, taking LT4 100mcg PO daily 6 days per week. She is currently feeling well on medication. Normal energy level.        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

## 2020-06-08 RX ORDER — METOPROLOL SUCCINATE 25 MG/1
TABLET, EXTENDED RELEASE ORAL
Qty: 90 TABLET | Refills: 3 | Status: SHIPPED | OUTPATIENT
Start: 2020-06-08 | End: 2021-05-11

## 2020-06-29 RX ORDER — LEVOTHYROXINE SODIUM 0.1 MG/1
TABLET ORAL
Qty: 90 TABLET | Refills: 3 | Status: SHIPPED | OUTPATIENT
Start: 2020-06-29

## 2020-06-29 NOTE — TELEPHONE ENCOUNTER
Spoke to pt - reminder to have labs completed ;   She has OV scheduled next week    Refill request is for a maintenance medication and has met the criteria specified in the Ambulatory Medication Refill Standing Order for eligibility, visits, laboratory, giacomo

## 2020-07-01 ENCOUNTER — APPOINTMENT (OUTPATIENT)
Dept: LAB | Age: 70
End: 2020-07-01
Attending: INTERNAL MEDICINE
Payer: MEDICARE

## 2020-07-01 DIAGNOSIS — R79.89 ELEVATED LFTS: ICD-10-CM

## 2020-07-01 DIAGNOSIS — E10.9 CONTROLLED DIABETES MELLITUS TYPE 1 WITHOUT COMPLICATIONS (HCC): ICD-10-CM

## 2020-07-01 DIAGNOSIS — M81.8 OTHER OSTEOPOROSIS WITHOUT CURRENT PATHOLOGICAL FRACTURE: ICD-10-CM

## 2020-07-01 DIAGNOSIS — E55.9 VITAMIN D DEFICIENCY: ICD-10-CM

## 2020-07-01 DIAGNOSIS — E78.00 HYPERCHOLESTEROLEMIA: ICD-10-CM

## 2020-07-01 LAB
25(OH)D3 SERPL-MCNC: 55.3 NG/ML (ref 30–100)
ALBUMIN SERPL-MCNC: 3.6 G/DL (ref 3.4–5)
ALBUMIN/GLOB SERPL: 0.8 {RATIO} (ref 1–2)
ALP LIVER SERPL-CCNC: 132 U/L (ref 55–142)
ALT SERPL-CCNC: 77 U/L (ref 13–56)
ANION GAP SERPL CALC-SCNC: 11 MMOL/L (ref 0–18)
AST SERPL-CCNC: 115 U/L (ref 15–37)
BILIRUB SERPL-MCNC: 1.1 MG/DL (ref 0.1–2)
BUN BLD-MCNC: 9 MG/DL (ref 7–18)
BUN/CREAT SERPL: 10 (ref 10–20)
CALCIUM BLD-MCNC: 9.4 MG/DL (ref 8.5–10.1)
CHLORIDE SERPL-SCNC: 101 MMOL/L (ref 98–112)
CHOLEST SMN-MCNC: 216 MG/DL (ref ?–200)
CO2 SERPL-SCNC: 26 MMOL/L (ref 21–32)
CREAT BLD-MCNC: 0.9 MG/DL (ref 0.55–1.02)
EST. AVERAGE GLUCOSE BLD GHB EST-MCNC: 88 MG/DL (ref 68–126)
GLOBULIN PLAS-MCNC: 4.6 G/DL (ref 2.8–4.4)
GLUCOSE BLD-MCNC: 116 MG/DL (ref 70–99)
HBA1C MFR BLD HPLC: 4.7 % (ref ?–5.7)
HDLC SERPL-MCNC: 120 MG/DL (ref 40–59)
LDLC SERPL CALC-MCNC: 69 MG/DL (ref ?–100)
M PROTEIN MFR SERPL ELPH: 8.2 G/DL (ref 6.4–8.2)
NONHDLC SERPL-MCNC: 96 MG/DL (ref ?–130)
OSMOLALITY SERPL CALC.SUM OF ELEC: 286 MOSM/KG (ref 275–295)
PATIENT FASTING Y/N/NP: YES
PATIENT FASTING Y/N/NP: YES
POTASSIUM SERPL-SCNC: 3.7 MMOL/L (ref 3.5–5.1)
PTH-INTACT SERPL-MCNC: 68.5 PG/ML (ref 18.5–88)
SODIUM SERPL-SCNC: 138 MMOL/L (ref 136–145)
TRIGL SERPL-MCNC: 134 MG/DL (ref 30–149)
VLDLC SERPL CALC-MCNC: 27 MG/DL (ref 0–30)

## 2020-07-01 PROCEDURE — 80061 LIPID PANEL: CPT

## 2020-07-01 PROCEDURE — 80053 COMPREHEN METABOLIC PANEL: CPT

## 2020-07-01 PROCEDURE — 83036 HEMOGLOBIN GLYCOSYLATED A1C: CPT

## 2020-07-01 PROCEDURE — 84080 ASSAY ALKALINE PHOSPHATASES: CPT

## 2020-07-01 PROCEDURE — 36415 COLL VENOUS BLD VENIPUNCTURE: CPT

## 2020-07-01 PROCEDURE — 82306 VITAMIN D 25 HYDROXY: CPT

## 2020-07-01 PROCEDURE — 83970 ASSAY OF PARATHORMONE: CPT

## 2020-07-02 LAB — BONE SPECIFIC ALKALINE PHOSPHATASE: 11.9 UG/L

## 2020-07-06 ENCOUNTER — OFFICE VISIT (OUTPATIENT)
Dept: INTERNAL MEDICINE CLINIC | Facility: CLINIC | Age: 70
End: 2020-07-06
Payer: MEDICARE

## 2020-07-06 VITALS
DIASTOLIC BLOOD PRESSURE: 68 MMHG | TEMPERATURE: 98 F | HEART RATE: 90 BPM | SYSTOLIC BLOOD PRESSURE: 128 MMHG | OXYGEN SATURATION: 99 % | RESPIRATION RATE: 16 BRPM | WEIGHT: 141 LBS | BODY MASS INDEX: 21 KG/M2

## 2020-07-06 DIAGNOSIS — I10 HYPERTENSION, ESSENTIAL: Primary | ICD-10-CM

## 2020-07-06 DIAGNOSIS — K76.0 FATTY LIVER: ICD-10-CM

## 2020-07-06 DIAGNOSIS — E10.9 TYPE 1 DIABETES MELLITUS WITHOUT COMPLICATION (HCC): ICD-10-CM

## 2020-07-06 DIAGNOSIS — R79.89 ELEVATED LFTS: ICD-10-CM

## 2020-07-06 DIAGNOSIS — K80.20 GALLSTONES: ICD-10-CM

## 2020-07-06 DIAGNOSIS — E78.00 HYPERCHOLESTEROLEMIA: ICD-10-CM

## 2020-07-06 PROCEDURE — G0463 HOSPITAL OUTPT CLINIC VISIT: HCPCS | Performed by: INTERNAL MEDICINE

## 2020-07-06 PROCEDURE — 99214 OFFICE O/P EST MOD 30 MIN: CPT | Performed by: INTERNAL MEDICINE

## 2020-07-06 RX ORDER — CYANOCOBALAMIN 1000 UG/ML
1000 INJECTION INTRAMUSCULAR; SUBCUTANEOUS
Qty: 3 VIAL | Refills: 3 | Status: SHIPPED | OUTPATIENT
Start: 2020-07-06 | End: 2020-11-06

## 2020-07-06 RX ORDER — INSULIN LISPRO 100 [IU]/ML
INJECTION, SOLUTION INTRAVENOUS; SUBCUTANEOUS
Qty: 15 ML | Refills: 3 | COMMUNITY
Start: 2020-07-06

## 2020-08-16 ENCOUNTER — APPOINTMENT (OUTPATIENT)
Dept: GENERAL RADIOLOGY | Facility: HOSPITAL | Age: 70
End: 2020-08-16
Attending: PHYSICIAN ASSISTANT
Payer: MEDICARE

## 2020-08-16 ENCOUNTER — HOSPITAL ENCOUNTER (EMERGENCY)
Facility: HOSPITAL | Age: 70
Discharge: HOME | End: 2020-08-16
Attending: PHYSICIAN ASSISTANT
Payer: MEDICARE

## 2020-08-16 VITALS
TEMPERATURE: 98 F | BODY MASS INDEX: 22 KG/M2 | WEIGHT: 145 LBS | RESPIRATION RATE: 20 BRPM | DIASTOLIC BLOOD PRESSURE: 65 MMHG | SYSTOLIC BLOOD PRESSURE: 147 MMHG | OXYGEN SATURATION: 97 % | HEART RATE: 108 BPM

## 2020-08-16 DIAGNOSIS — S82.032A CLOSED DISPLACED TRANSVERSE FRACTURE OF LEFT PATELLA, INITIAL ENCOUNTER: Primary | ICD-10-CM

## 2020-08-16 DIAGNOSIS — R03.0 ELEVATED BLOOD PRESSURE READING: ICD-10-CM

## 2020-08-16 PROCEDURE — 99284 EMERGENCY DEPT VISIT MOD MDM: CPT

## 2020-08-16 PROCEDURE — 73560 X-RAY EXAM OF KNEE 1 OR 2: CPT | Performed by: PHYSICIAN ASSISTANT

## 2020-08-16 RX ORDER — TRAMADOL HYDROCHLORIDE 50 MG/1
50 TABLET ORAL EVERY 6 HOURS PRN
Qty: 12 TABLET | Refills: 0 | Status: SHIPPED | OUTPATIENT
Start: 2020-08-16 | End: 2020-09-08

## 2020-08-16 RX ORDER — TRAMADOL HYDROCHLORIDE 50 MG/1
50 TABLET ORAL ONCE
Status: COMPLETED | OUTPATIENT
Start: 2020-08-16 | End: 2020-08-16

## 2020-08-16 RX ORDER — TRAMADOL HYDROCHLORIDE 50 MG/1
50 TABLET ORAL EVERY 6 HOURS PRN
Qty: 12 TABLET | Refills: 0 | Status: SHIPPED | OUTPATIENT
Start: 2020-08-16 | End: 2020-09-09

## 2020-08-16 NOTE — ED INITIAL ASSESSMENT (HPI)
Pt arrived via EMS from home, reports mechanical fall while using walker. Hit left knee against floor. Denies dizziness. Denies head injury. Denies LOC. Denies blood thinners.

## 2020-08-17 ENCOUNTER — TELEPHONE (OUTPATIENT)
Dept: ORTHOPEDICS CLINIC | Facility: CLINIC | Age: 70
End: 2020-08-17

## 2020-08-17 NOTE — TELEPHONE ENCOUNTER
Pt has appt on 8/20 but asking to be seen sooner, pt was seen at [facility] ED yesterday for a patella fracture. Please advise thank you.

## 2020-08-17 NOTE — ED PROVIDER NOTES
Patient Seen in: [facility] AND [facility] Emergency Department    History   Patient presents with:  Fall    Stated Complaint:     HPI    HPI: Caitlyn Martinez is a 81year old female who presents with chief complaint of left knee pain. Onset 2 hours ago.   Patient KWIKPEN) 100 UNIT/ML Subcutaneous Solution Pen-injector,  Inject 2-5 units before meals. cyanocobalamin 1000 MCG/ML Injection Solution,  Inject 1 mL (1,000 mcg total) into the skin every 30 (thirty) days.    Levothyroxine Sodium 100 MCG Oral Tab,  TAKE 1 above.    PSFH elements reviewed from today and agreed except as otherwise stated in HPI.     Physical Exam     ED Triage Vitals [08/16/20 1900]   BP (!) 171/82   Pulse 106   Resp 24   Temp 98 °F (36.7 °C)   Temp src Oral   SpO2 98 %   O2 Device None (Room speech. Skin: Skin is normal to inspection. Warm and dry. No obvious bruising. No obvious rash.     Differential diagnosis to include fracture vs. Strain/sprain vs. contusion      ED Course   Labs Reviewed - No data to display  Patient fitted and knee i medications    traMADol HCl 50 MG Oral Tab  Take 1 tablet (50 mg total) by mouth every 6 (six) hours as needed.   Qty: 12 tablet Refills: 0

## 2020-08-17 NOTE — TELEPHONE ENCOUNTER
Pt notified and offered appt at 815am on 8/18 but she has to see if she can get transport. If she wants appt she will CB before the end of the day.

## 2020-08-17 NOTE — TELEPHONE ENCOUNTER
She will need surgery and it is ok to wait until 8/20. However, if patient wants to be seen earlier, I can see her tomorrow add-on for 0800.

## 2020-08-17 NOTE — ED NOTES
Pt was able to stand and ambulate with walker in room. C/o left knee pain, but was able to stand and ambulate with walker.  Pt states she does not feel comfortable with the walker that the hospital has but she has her own walker at home that she is more com

## 2020-08-17 NOTE — TELEPHONE ENCOUNTER
S/w pt and she states she was walking to bathroom with her rollator and her legs gave out and she injured left knee on 8/16/20.  Pt went to ER and had XR and put pt in knee immobilizer which she took off and can't back on so her son is coming over to help h

## 2020-08-18 ENCOUNTER — OFFICE VISIT (OUTPATIENT)
Dept: ORTHOPEDICS CLINIC | Facility: CLINIC | Age: 70
End: 2020-08-18
Payer: MEDICARE

## 2020-08-18 ENCOUNTER — TELEPHONE (OUTPATIENT)
Dept: INTERNAL MEDICINE CLINIC | Facility: CLINIC | Age: 70
End: 2020-08-18

## 2020-08-18 ENCOUNTER — TELEPHONE (OUTPATIENT)
Dept: ORTHOPEDICS CLINIC | Facility: CLINIC | Age: 70
End: 2020-08-18

## 2020-08-18 DIAGNOSIS — S82.032A DISPLACED TRANSVERSE FRACTURE OF LEFT PATELLA, INITIAL ENCOUNTER FOR CLOSED FRACTURE: Primary | ICD-10-CM

## 2020-08-18 PROCEDURE — G0463 HOSPITAL OUTPT CLINIC VISIT: HCPCS | Performed by: ORTHOPAEDIC SURGERY

## 2020-08-18 PROCEDURE — 99204 OFFICE O/P NEW MOD 45 MIN: CPT | Performed by: ORTHOPAEDIC SURGERY

## 2020-08-18 NOTE — H&P
NURSING INTAKE COMMENTS: Patient presents with:  Consult: referred by [facility] ED on 8/16/2020 after a fall injuring left knee, xray left knee in ED, 7/10 left knee pain, pt is taking tramadol for pain, pt denies previous injury       HPI: This 81year old • traMADol HCl 50 MG Oral Tab Take 1 tablet (50 mg total) by mouth every 6 (six) hours as needed. 12 tablet 0   • traMADol HCl 50 MG Oral Tab Take 1 tablet (50 mg total) by mouth every 6 (six) hours as needed.  12 tablet 0   • Insulin Lispro, 1 Unit Dial, years: 11.5        Types: Cigarettes        Start date: 1972        Quit date: 2018        Years since quittin.7      Smokeless tobacco: Never Used    Substance and Sexual Activity      Alcohol use:  Yes        Alcohol/week: 2.0 standard drink CONCLUSION:   Transverse, comminuted fracture of the mid patella with 2.5 cm of distraction. Significant anterior prepatellar swelling.      Dictated by (CST): Svetlana Giraldo MD on 8/16/2020 at 7:47 PM     Finalized by (CST): Svetlana Giraldo MD on 8/16/2020

## 2020-08-18 NOTE — TELEPHONE ENCOUNTER
To nursing, please call the nurse of patient's Ortho, Dr Lugenia Harada, and tell her pt declines to come to see me (her PCP) for pre-op eval. Ask her to let Dr Davion Marie know this. Thanks. This message also routed to Dr Davion Marie.

## 2020-08-18 NOTE — TELEPHONE ENCOUNTER
Spoke with patient briefly. She states she saw the surgeon today and was told pre-op clearance is preferred, but not necessary for procedure to be done on 8/20/20. Patient would like to opt out of another office visit at this time.  She states she took a me

## 2020-08-18 NOTE — TELEPHONE ENCOUNTER
Patient returning phone call below. Patient states she has no ride to and from the office and cannot walk. Patient stated that the surgical doctor informed her that he would complete the surgery with or without clearance from Dr Radha Pulido.

## 2020-08-18 NOTE — TELEPHONE ENCOUNTER
Dr. Roxie Rivera please see message. Also there is a TE in [address] with pcp today you can review.

## 2020-08-18 NOTE — H&P (VIEW-ONLY)
NURSING INTAKE COMMENTS: Patient presents with:  Consult: referred by [facility] ED on 8/16/2020 after a fall injuring left knee, xray left knee in ED, 7/10 left knee pain, pt is taking tramadol for pain, pt denies previous injury       HPI: This 81year old • traMADol HCl 50 MG Oral Tab Take 1 tablet (50 mg total) by mouth every 6 (six) hours as needed. 12 tablet 0   • traMADol HCl 50 MG Oral Tab Take 1 tablet (50 mg total) by mouth every 6 (six) hours as needed.  12 tablet 0   • Insulin Lispro, 1 Unit Dial, years: 11.5        Types: Cigarettes        Start date: 1972        Quit date: 2018        Years since quittin.7      Smokeless tobacco: Never Used    Substance and Sexual Activity      Alcohol use:  Yes        Alcohol/week: 2.0 standard drink CONCLUSION:   Transverse, comminuted fracture of the mid patella with 2.5 cm of distraction. Significant anterior prepatellar swelling.      Dictated by (CST): Elaine Moseley MD on 8/16/2020 at 7:47 PM     Finalized by (CST): Elaine Moseley MD on 8/16/2020

## 2020-08-18 NOTE — TELEPHONE ENCOUNTER
Called pt and she confirmed she will not be getting pre op clearance. She states it is too last minute and she has transport issues and Dr. Dee Aguirre told her he would take her to sx with or without clearance.    I informed pt that pcp informed us and

## 2020-08-18 NOTE — TELEPHONE ENCOUNTER
Left message with Estefany at Dr. Barba Phlegm office that patient declines pre-op exam with PCP. Estefany verbalized understanding and will relay message to Dr. Haylee Guzman nurse. Spoke to patient and relayed MD message. Patient verbalized understanding.

## 2020-08-18 NOTE — TELEPHONE ENCOUNTER
To --I rec'd a \"chat\" msg from Dr Amber Bauer (ortho) who reqst pre-op eval for patella fx surgery Thursday 8/20/20.    Pt needs an appt w me today (can use MD approval if needed) or tomorrow at the latest. Thanks for calling her to sched

## 2020-08-19 ENCOUNTER — MED REC SCAN ONLY (OUTPATIENT)
Dept: ORTHOPEDICS CLINIC | Facility: CLINIC | Age: 70
End: 2020-08-19

## 2020-08-19 NOTE — PAT NURSING NOTE
Per Earnest King at Dr. Damon Balloon office,  [place] ok to use non-operative foot for IV site as pt. Has history of bilateral mastectomies with lymph node removal but states also check with anesthesia.   Per Dr. Lara [address] anesthesia on call , ok to use non-operat

## 2020-08-20 ENCOUNTER — ANESTHESIA EVENT (OUTPATIENT)
Dept: SURGERY | Facility: HOSPITAL | Age: 70
End: 2020-08-20
Payer: MEDICARE

## 2020-08-20 ENCOUNTER — HOSPITAL ENCOUNTER (OUTPATIENT)
Facility: HOSPITAL | Age: 70
Discharge: NURSING, REHAB OR LONG-TERM CARE | End: 2020-08-24
Attending: ORTHOPAEDIC SURGERY | Admitting: ORTHOPAEDIC SURGERY
Payer: MEDICARE

## 2020-08-20 ENCOUNTER — APPOINTMENT (OUTPATIENT)
Dept: GENERAL RADIOLOGY | Facility: HOSPITAL | Age: 70
End: 2020-08-20
Attending: ORTHOPAEDIC SURGERY
Payer: MEDICARE

## 2020-08-20 ENCOUNTER — ANESTHESIA (OUTPATIENT)
Dept: SURGERY | Facility: HOSPITAL | Age: 70
End: 2020-08-20
Payer: MEDICARE

## 2020-08-20 DIAGNOSIS — Z01.818 PREOP TESTING: Primary | ICD-10-CM

## 2020-08-20 PROBLEM — Z85.3 HISTORY OF BREAST CANCER: Chronic | Status: ACTIVE | Noted: 2020-08-20

## 2020-08-20 PROBLEM — S82.002A LEFT PATELLA FRACTURE: Status: ACTIVE | Noted: 2020-08-20

## 2020-08-20 LAB
GLUCOSE BLDC GLUCOMTR-MCNC: 143 MG/DL (ref 70–99)
GLUCOSE BLDC GLUCOMTR-MCNC: 162 MG/DL (ref 70–99)
GLUCOSE BLDC GLUCOMTR-MCNC: 211 MG/DL (ref 70–99)
MRSA DNA SPEC QL NAA+PROBE: NEGATIVE
SARS-COV-2 RNA RESP QL NAA+PROBE: NOT DETECTED

## 2020-08-20 PROCEDURE — 99202 OFFICE O/P NEW SF 15 MIN: CPT | Performed by: HOSPITALIST

## 2020-08-20 PROCEDURE — 27524 TREAT KNEECAP FRACTURE: CPT | Performed by: ORTHOPAEDIC SURGERY

## 2020-08-20 PROCEDURE — 76000 FLUOROSCOPY <1 HR PHYS/QHP: CPT | Performed by: ORTHOPAEDIC SURGERY

## 2020-08-20 PROCEDURE — 0QSF04Z REPOSITION LEFT PATELLA WITH INTERNAL FIXATION DEVICE, OPEN APPROACH: ICD-10-PCS | Performed by: ORTHOPAEDIC SURGERY

## 2020-08-20 RX ORDER — CEFAZOLIN SODIUM/WATER 2 G/20 ML
2 SYRINGE (ML) INTRAVENOUS ONCE
Status: COMPLETED | OUTPATIENT
Start: 2020-08-20 | End: 2020-08-20

## 2020-08-20 RX ORDER — HYDROMORPHONE HYDROCHLORIDE 1 MG/ML
0.4 INJECTION, SOLUTION INTRAMUSCULAR; INTRAVENOUS; SUBCUTANEOUS EVERY 5 MIN PRN
Status: DISCONTINUED | OUTPATIENT
Start: 2020-08-20 | End: 2020-08-20 | Stop reason: HOSPADM

## 2020-08-20 RX ORDER — SODIUM CHLORIDE, SODIUM LACTATE, POTASSIUM CHLORIDE, CALCIUM CHLORIDE 600; 310; 30; 20 MG/100ML; MG/100ML; MG/100ML; MG/100ML
INJECTION, SOLUTION INTRAVENOUS CONTINUOUS
Status: DISCONTINUED | OUTPATIENT
Start: 2020-08-20 | End: 2020-08-20 | Stop reason: HOSPADM

## 2020-08-20 RX ORDER — METOPROLOL TARTRATE 5 MG/5ML
2.5 INJECTION INTRAVENOUS ONCE
Status: DISCONTINUED | OUTPATIENT
Start: 2020-08-20 | End: 2020-08-20 | Stop reason: HOSPADM

## 2020-08-20 RX ORDER — ACETAMINOPHEN 325 MG/1
650 TABLET ORAL EVERY 4 HOURS PRN
Status: DISCONTINUED | OUTPATIENT
Start: 2020-08-20 | End: 2020-08-24

## 2020-08-20 RX ORDER — SODIUM PHOSPHATE, DIBASIC AND SODIUM PHOSPHATE, MONOBASIC 7; 19 G/133ML; G/133ML
1 ENEMA RECTAL ONCE AS NEEDED
Status: DISCONTINUED | OUTPATIENT
Start: 2020-08-20 | End: 2020-08-24

## 2020-08-20 RX ORDER — HALOPERIDOL 5 MG/ML
0.25 INJECTION INTRAMUSCULAR ONCE AS NEEDED
Status: DISCONTINUED | OUTPATIENT
Start: 2020-08-20 | End: 2020-08-20 | Stop reason: HOSPADM

## 2020-08-20 RX ORDER — METOPROLOL SUCCINATE 25 MG/1
25 TABLET, EXTENDED RELEASE ORAL DAILY
Status: DISCONTINUED | OUTPATIENT
Start: 2020-08-21 | End: 2020-08-24

## 2020-08-20 RX ORDER — GLYCOPYRROLATE 0.2 MG/ML
INJECTION, SOLUTION INTRAMUSCULAR; INTRAVENOUS AS NEEDED
Status: DISCONTINUED | OUTPATIENT
Start: 2020-08-20 | End: 2020-08-20 | Stop reason: SURG

## 2020-08-20 RX ORDER — ACETAMINOPHEN 500 MG
1000 TABLET ORAL ONCE
Status: COMPLETED | OUTPATIENT
Start: 2020-08-20 | End: 2020-08-20

## 2020-08-20 RX ORDER — HYDROMORPHONE HYDROCHLORIDE 1 MG/ML
0.6 INJECTION, SOLUTION INTRAMUSCULAR; INTRAVENOUS; SUBCUTANEOUS EVERY 5 MIN PRN
Status: DISCONTINUED | OUTPATIENT
Start: 2020-08-20 | End: 2020-08-20 | Stop reason: HOSPADM

## 2020-08-20 RX ORDER — HYDROCODONE BITARTRATE AND ACETAMINOPHEN 5; 325 MG/1; MG/1
2 TABLET ORAL AS NEEDED
Status: DISCONTINUED | OUTPATIENT
Start: 2020-08-20 | End: 2020-08-20 | Stop reason: HOSPADM

## 2020-08-20 RX ORDER — ONDANSETRON 2 MG/ML
4 INJECTION INTRAMUSCULAR; INTRAVENOUS ONCE AS NEEDED
Status: DISCONTINUED | OUTPATIENT
Start: 2020-08-20 | End: 2020-08-20 | Stop reason: HOSPADM

## 2020-08-20 RX ORDER — METOCLOPRAMIDE 10 MG/1
10 TABLET ORAL ONCE
Status: DISCONTINUED | OUTPATIENT
Start: 2020-08-20 | End: 2020-08-20 | Stop reason: HOSPADM

## 2020-08-20 RX ORDER — FAMOTIDINE 20 MG/1
20 TABLET ORAL ONCE
Status: DISCONTINUED | OUTPATIENT
Start: 2020-08-20 | End: 2020-08-20 | Stop reason: HOSPADM

## 2020-08-20 RX ORDER — DEXTROSE MONOHYDRATE 25 G/50ML
50 INJECTION, SOLUTION INTRAVENOUS
Status: DISCONTINUED | OUTPATIENT
Start: 2020-08-20 | End: 2020-08-20 | Stop reason: HOSPADM

## 2020-08-20 RX ORDER — NEOSTIGMINE METHYLSULFATE 1 MG/ML
INJECTION INTRAVENOUS AS NEEDED
Status: DISCONTINUED | OUTPATIENT
Start: 2020-08-20 | End: 2020-08-20 | Stop reason: SURG

## 2020-08-20 RX ORDER — MORPHINE SULFATE 4 MG/ML
2 INJECTION, SOLUTION INTRAMUSCULAR; INTRAVENOUS EVERY 10 MIN PRN
Status: DISCONTINUED | OUTPATIENT
Start: 2020-08-20 | End: 2020-08-20 | Stop reason: HOSPADM

## 2020-08-20 RX ORDER — HYDROCODONE BITARTRATE AND ACETAMINOPHEN 5; 325 MG/1; MG/1
1 TABLET ORAL EVERY 4 HOURS PRN
Status: DISCONTINUED | OUTPATIENT
Start: 2020-08-20 | End: 2020-08-24

## 2020-08-20 RX ORDER — ONDANSETRON 2 MG/ML
INJECTION INTRAMUSCULAR; INTRAVENOUS AS NEEDED
Status: DISCONTINUED | OUTPATIENT
Start: 2020-08-20 | End: 2020-08-20 | Stop reason: SURG

## 2020-08-20 RX ORDER — BISACODYL 10 MG
10 SUPPOSITORY, RECTAL RECTAL
Status: DISCONTINUED | OUTPATIENT
Start: 2020-08-20 | End: 2020-08-24

## 2020-08-20 RX ORDER — CEFAZOLIN SODIUM/WATER 2 G/20 ML
2 SYRINGE (ML) INTRAVENOUS EVERY 8 HOURS
Status: COMPLETED | OUTPATIENT
Start: 2020-08-21 | End: 2020-08-21

## 2020-08-20 RX ORDER — HYDROCODONE BITARTRATE AND ACETAMINOPHEN 5; 325 MG/1; MG/1
1 TABLET ORAL AS NEEDED
Status: DISCONTINUED | OUTPATIENT
Start: 2020-08-20 | End: 2020-08-20 | Stop reason: HOSPADM

## 2020-08-20 RX ORDER — NALOXONE HYDROCHLORIDE 0.4 MG/ML
80 INJECTION, SOLUTION INTRAMUSCULAR; INTRAVENOUS; SUBCUTANEOUS AS NEEDED
Status: DISCONTINUED | OUTPATIENT
Start: 2020-08-20 | End: 2020-08-20 | Stop reason: HOSPADM

## 2020-08-20 RX ORDER — ROCURONIUM BROMIDE 10 MG/ML
INJECTION, SOLUTION INTRAVENOUS AS NEEDED
Status: DISCONTINUED | OUTPATIENT
Start: 2020-08-20 | End: 2020-08-20 | Stop reason: SURG

## 2020-08-20 RX ORDER — MORPHINE SULFATE 10 MG/ML
6 INJECTION, SOLUTION INTRAMUSCULAR; INTRAVENOUS EVERY 10 MIN PRN
Status: DISCONTINUED | OUTPATIENT
Start: 2020-08-20 | End: 2020-08-20 | Stop reason: HOSPADM

## 2020-08-20 RX ORDER — DOCUSATE SODIUM 100 MG/1
100 CAPSULE, LIQUID FILLED ORAL 2 TIMES DAILY
Status: DISCONTINUED | OUTPATIENT
Start: 2020-08-20 | End: 2020-08-24

## 2020-08-20 RX ORDER — BUPIVACAINE HYDROCHLORIDE AND EPINEPHRINE 2.5; 5 MG/ML; UG/ML
INJECTION, SOLUTION INFILTRATION; PERINEURAL AS NEEDED
Status: DISCONTINUED | OUTPATIENT
Start: 2020-08-20 | End: 2020-08-20 | Stop reason: HOSPADM

## 2020-08-20 RX ORDER — ANASTROZOLE 1 MG/1
1 TABLET ORAL DAILY
Status: DISCONTINUED | OUTPATIENT
Start: 2020-08-21 | End: 2020-08-24

## 2020-08-20 RX ORDER — MORPHINE SULFATE 4 MG/ML
4 INJECTION, SOLUTION INTRAMUSCULAR; INTRAVENOUS EVERY 10 MIN PRN
Status: DISCONTINUED | OUTPATIENT
Start: 2020-08-20 | End: 2020-08-20 | Stop reason: HOSPADM

## 2020-08-20 RX ORDER — LEVOTHYROXINE SODIUM 0.1 MG/1
100 TABLET ORAL
Status: DISCONTINUED | OUTPATIENT
Start: 2020-08-21 | End: 2020-08-24

## 2020-08-20 RX ORDER — SODIUM CHLORIDE, SODIUM LACTATE, POTASSIUM CHLORIDE, CALCIUM CHLORIDE 600; 310; 30; 20 MG/100ML; MG/100ML; MG/100ML; MG/100ML
INJECTION, SOLUTION INTRAVENOUS CONTINUOUS
Status: DISCONTINUED | OUTPATIENT
Start: 2020-08-20 | End: 2020-08-24

## 2020-08-20 RX ORDER — MIDAZOLAM HYDROCHLORIDE 1 MG/ML
INJECTION INTRAMUSCULAR; INTRAVENOUS AS NEEDED
Status: DISCONTINUED | OUTPATIENT
Start: 2020-08-20 | End: 2020-08-20 | Stop reason: SURG

## 2020-08-20 RX ORDER — HYDROMORPHONE HYDROCHLORIDE 1 MG/ML
0.2 INJECTION, SOLUTION INTRAMUSCULAR; INTRAVENOUS; SUBCUTANEOUS EVERY 5 MIN PRN
Status: DISCONTINUED | OUTPATIENT
Start: 2020-08-20 | End: 2020-08-20 | Stop reason: HOSPADM

## 2020-08-20 RX ORDER — PROCHLORPERAZINE EDISYLATE 5 MG/ML
5 INJECTION INTRAMUSCULAR; INTRAVENOUS ONCE AS NEEDED
Status: DISCONTINUED | OUTPATIENT
Start: 2020-08-20 | End: 2020-08-20 | Stop reason: HOSPADM

## 2020-08-20 RX ORDER — ASPIRIN 81 MG/1
81 TABLET, CHEWABLE ORAL 2 TIMES DAILY
Status: DISCONTINUED | OUTPATIENT
Start: 2020-08-20 | End: 2020-08-24

## 2020-08-20 RX ORDER — POLYETHYLENE GLYCOL 3350 17 G/17G
17 POWDER, FOR SOLUTION ORAL DAILY PRN
Status: DISCONTINUED | OUTPATIENT
Start: 2020-08-20 | End: 2020-08-24

## 2020-08-20 RX ORDER — HYDROCODONE BITARTRATE AND ACETAMINOPHEN 5; 325 MG/1; MG/1
2 TABLET ORAL EVERY 4 HOURS PRN
Status: DISCONTINUED | OUTPATIENT
Start: 2020-08-20 | End: 2020-08-24

## 2020-08-20 RX ADMIN — GLYCOPYRROLATE 0.6 MG: 0.2 INJECTION, SOLUTION INTRAMUSCULAR; INTRAVENOUS at 18:03:00

## 2020-08-20 RX ADMIN — MIDAZOLAM HYDROCHLORIDE 2 MG: 1 INJECTION INTRAMUSCULAR; INTRAVENOUS at 16:37:00

## 2020-08-20 RX ADMIN — CEFAZOLIN SODIUM/WATER 2 G: 2 G/20 ML SYRINGE (ML) INTRAVENOUS at 16:50:00

## 2020-08-20 RX ADMIN — SODIUM CHLORIDE, SODIUM LACTATE, POTASSIUM CHLORIDE, CALCIUM CHLORIDE: 600; 310; 30; 20 INJECTION, SOLUTION INTRAVENOUS at 18:19:00

## 2020-08-20 RX ADMIN — ROCURONIUM BROMIDE 30 MG: 10 INJECTION, SOLUTION INTRAVENOUS at 16:47:00

## 2020-08-20 RX ADMIN — ONDANSETRON 4 MG: 2 INJECTION INTRAMUSCULAR; INTRAVENOUS at 16:49:00

## 2020-08-20 RX ADMIN — NEOSTIGMINE METHYLSULFATE 3 MG: 1 INJECTION INTRAVENOUS at 18:03:00

## 2020-08-20 NOTE — ANESTHESIA POSTPROCEDURE EVALUATION
Patient: Glory Deutsch    Procedure Summary     Date:  08/20/20 Room / Location:  [facility] OR  / [facility] OR    Anesthesia Start:  7502 Anesthesia Stop:      Procedure:  PATELLA OPEN REDUCTION INTERNAL FIXATION (Left ) Diagnosis:  (left patella fracture,

## 2020-08-20 NOTE — OPERATIVE REPORT
[facility] OPERATING ROOM  Operative Note     [facility] Location: OR   [facility] [id number] MRN [id number]   Admission Date 8/20/2020 Operation Date 8/20/2020   Attending Physician Issac Bashir MD Operating Physician Harjit Sage MD      Pre Patient was given IV antibiotics per protocol prior to making skin incisions. The knee was elevated and wrapped with Esmarch bandage for exsanguination and tourniquet was elevated to 250 mmHg for total tourniquet time of 60 minutes.   This time I made a l

## 2020-08-20 NOTE — ANESTHESIA PROCEDURE NOTES
Airway  Urgency: Elective      General Information and Staff    Patient location during procedure: OR  Anesthesiologist: Oscar Rivera MD  Performed: anesthesiologist     Indications and Patient Condition  Indications for airway management: anesthesia  Se

## 2020-08-20 NOTE — ANESTHESIA PREPROCEDURE EVALUATION
Anesthesia PreOp Note    HPI:     Jose Alejandro Kern is a 81year old female who presents for preoperative consultation requested by: Jory Washburn MD    Date of Surgery: 8/20/2020    Procedure(s):  PATELLA OPEN REDUCTION INTERNAL FIXATION  Indication: l plate placed for fx proximal tib-fib fx--Dr Love Man. in [facility] LEFT  03/2017    in [place] for bilateral breast cancer   • MASTECTOMY RIGHT  03/2017    in [place] for bilateral breast cancer       Denosumab (PROLIA) 60 MG/ML Taking  BD PEN NEEDLE SHORT U/F 31G X 8 MM Does not apply Misc, Use up to 5x Q day as directed., Disp: , Rfl: , Taking      lactated ringers infusion, , Intravenous, Continuous, Maxcine Karina Bah MD  acetaminophen (TYLENOL EXTRA STRENGTH) tab 1,000 mg, Substance and Sexual Activity      Alcohol use: Not Currently      Drug use: Never      Sexual activity: Not on file    Lifestyle      Physical activity:        Days per week: Not on file        Minutes per session: Not on file      Stress: Not on file Anesthesia Plan:   ASA:  2  Plan:   General  Airway:  ETT  Informed Consent Plan and Risks Discussed With:  Patient      I have informed Kavon Surinamese and/or legal guardian or family member of the nature of the anesthetic plan, benefits, risk

## 2020-08-20 NOTE — INTERVAL H&P NOTE
Pre-op Diagnosis: left patella fracture, displaced    The above referenced H&P was reviewed by Ganesh Parsons MD on 8/20/2020, the patient was examined and no significant changes have occurred in the patient's condition since the H&P was performed.   I

## 2020-08-20 NOTE — PROGRESS NOTES
[facility]    Progress Note    Rakel Vasques Patient Status:  Outpatient in a Bed    1950 MRN [id number]   Location [address] [facility] Attending Carlos Barnes MD   Hosp Day # 0 PCP Roman Liz MD Hypothyroidism (acquired)  CONT HOME MEDS. Hypertension, essential  CONT HOME MEDS, MONITOR,. History of breast cancer  CONT HOME MEDS.              Results:     Lab Results   Component Value Date    WBC 3.8 (L) 07/12/2019    HGB 13.5 07/12/2019

## 2020-08-21 LAB
GLUCOSE BLDC GLUCOMTR-MCNC: 184 MG/DL (ref 70–99)
GLUCOSE BLDC GLUCOMTR-MCNC: 213 MG/DL (ref 70–99)
GLUCOSE BLDC GLUCOMTR-MCNC: 214 MG/DL (ref 70–99)
GLUCOSE BLDC GLUCOMTR-MCNC: 228 MG/DL (ref 70–99)
GLUCOSE BLDC GLUCOMTR-MCNC: 229 MG/DL (ref 70–99)
GLUCOSE BLDC GLUCOMTR-MCNC: 25 MG/DL (ref 70–99)
GLUCOSE BLDC GLUCOMTR-MCNC: 40 MG/DL (ref 70–99)
GLUCOSE BLDC GLUCOMTR-MCNC: 71 MG/DL (ref 70–99)
GLUCOSE BLDC GLUCOMTR-MCNC: 87 MG/DL (ref 70–99)

## 2020-08-21 PROCEDURE — 99214 OFFICE O/P EST MOD 30 MIN: CPT | Performed by: HOSPITALIST

## 2020-08-21 RX ORDER — DEXTROSE MONOHYDRATE 25 G/50ML
50 INJECTION, SOLUTION INTRAVENOUS
Status: DISCONTINUED | OUTPATIENT
Start: 2020-08-21 | End: 2020-08-24

## 2020-08-21 RX ORDER — HYDROCODONE BITARTRATE AND ACETAMINOPHEN 5; 325 MG/1; MG/1
1 TABLET ORAL EVERY 6 HOURS PRN
Qty: 30 TABLET | Refills: 0 | Status: ON HOLD | OUTPATIENT
Start: 2020-08-21 | End: 2020-09-14

## 2020-08-21 RX ORDER — ASPIRIN 81 MG/1
81 TABLET ORAL 2 TIMES DAILY WITH MEALS
Qty: 60 TABLET | Refills: 0 | Status: SHIPPED | OUTPATIENT
Start: 2020-08-21 | End: 2020-09-08 | Stop reason: ALTCHOICE

## 2020-08-21 NOTE — PLAN OF CARE
Problem: Patient Centered Care  Goal: Patient preferences are identified and integrated in the patient's plan of care  Description  Interventions:  - What would you like us to know as we care for you?   - Provide timely, complete, and accurate informatio with activity and pre-medicate as appropriate  8/21/2020 1806 by Jeff Kaiser RN  Outcome: Progressing  8/21/2020 1103 by Jeff Kaiser RN  Outcome: Progressing     Problem: RISK FOR INFECTION - ADULT  Goal: Absence of fever/infection during anticipated medications. Pt worked with PT this afternoon, pt stood at edge of bed. Pt up to rolling chair with RN to bathroom. Pt refused to sit in chair this afternoon, Mepilex applied to sacrum. Plan to d/c to rehab.

## 2020-08-21 NOTE — OCCUPATIONAL THERAPY NOTE
OCCUPATIONAL THERAPY EVALUATION - INPATIENT      Room Number: 415/415-A  Evaluation Date: 8/21/2020  Type of Evaluation: Initial  Presenting Problem: L patella fx.  S/P ORIF    Physician Order: IP Consult to Occupational Therapy  Reason for Therapy: ADL/IAD with assist only to bring L LE into bed. Pt required a lot of encouragement and instruction that her fracture had been fixed and that there was no restriction to moving L LE within limits of the immobilizer.  Encouraged pt to get up to the chair for dinner History  Past Surgical History:   Procedure Laterality Date   • ANKLE FRACTURE SURGERY Right 08/2018    in [place]. (plate placed).  Dr Srinivasan in [place]   • CATARACT EXTRACTION Bilateral 2016    Dr Mookie Baker Right 11 clothing?: A Lot  -   Bathing (including washing, rinsing, drying)?: A Lot  -   Toileting, which includes using toilet, bedpan or urinal? : A Lot  -   Putting on and taking off regular upper body clothing?: A Little  -   Taking care of personal grooming perdue

## 2020-08-21 NOTE — PROGRESS NOTES
[facility]    Progress Note    Jose Alejandro Kern Patient Status:  Outpatient in a Bed    1950 MRN [id number]   Location [facility] 4W/SW/SE Attending James Carranza MD   Hosp Day # 0 PCP Dorota Ware MD       Subjective:   David Mccloud • aspirin  81 mg Oral BID   • anastrozole  1 mg Oral Daily   • insulin detemir  11 Units Subcutaneous Nightly   • Insulin Aspart Pen  3 Units Subcutaneous TID CC   • Levothyroxine Sodium  100 mcg Oral Once per day on Mon Tue Wed Thu Fri Sat   • Metoprolo

## 2020-08-21 NOTE — PLAN OF CARE
Patient A&Ox4, POD 0-1. ACHS blood sugar checks. Bed time . CGM at [facility]. Ice pack and immobilizer to L knee. LLE WBAT, knee to stay locked in extension. SCD LLE. Check void at 0400. Purewick in place.   Bilat arm restrictions d/t hx bilat masec comfort level or patient's stated pain goal  Description  INTERVENTIONS:  - Encourage pt to monitor pain and request assistance  - Assess pain using appropriate pain scale  - Administer analgesics based on type and severity of pain and evaluate response  - care, etc)  - Arrange for interpreters to assist at discharge as needed  - Consider post-discharge preferences of patient/family/discharge partner  - Complete POLST form as appropriate  - Assess patient's ability to be responsible for managing their own he

## 2020-08-21 NOTE — PROGRESS NOTES
[facility]    Progress Note    Bhargavi Mariscal Patient Status:  Outpatient in a Bed    1950 MRN [id number]   Location [facility] 4W/SW/SE Attending Johnathon Estrada MD   Hosp Day # 0 PCP Brenda Horner MD        Subjective: 07/01/2020    K 3.7 07/01/2020     07/01/2020    CO2 26.0 07/01/2020     (H) 07/01/2020    CA 9.4 07/01/2020    ALB 3.6 07/01/2020    ALKPHO 132 07/01/2020    BILT 1.1 07/01/2020    TP 8.2 07/01/2020     (H) 07/01/2020    ALT 77 (H) 07/

## 2020-08-21 NOTE — PHYSICAL THERAPY NOTE
PHYSICAL THERAPY EVALUATION - INPATIENT     Room Number: 415/415-A  Evaluation Date: 8/21/2020  Type of Evaluation: Initial   Physician Order: PT Eval and Treat    Presenting Problem: s/p L patella ORIF (fx from a fall at home)  Reason for Therapy: [facility] Impairment: 54.16% has been calculated based on documentation in the [facility] '6 clicks' Inpatient Basic Mobility Short Form. Research supports that patients with this level of impairment may benefit from SINCERE.         DISCHARGE RECOMMENDATIONS  PT Discharge R SURGERY Right 11/2018    plate placed for fx proximal tib-fib fx--Dr Janet Gleason. in [facility] LEFT  03/2017    in [place] for bilateral breast cancer   • MASTECTOMY RIGHT  03/2017    in [place] for bilateral breast cancer       HOME sitting on the side of the bed?: A Little   How much help from another person does the patient currently need. ..   -   Moving to and from a bed to a chair (including a wheelchair)?: A Little   -   Need to walk in hospital room?: A Lot   -   Climbing 3-5 st

## 2020-08-21 NOTE — PROGRESS NOTES
Patient refusing Levemir and Novolog orders. States she is very sensitive to insulin and will only take Lantus and Humalog. Spoke to Dr Harris Lanier. Patient can take her own insulin on her own regimen.   Updated patient and explained process of sending the

## 2020-08-21 NOTE — PHYSICAL THERAPY NOTE
Attempted PT evaluation however per RN pt currently with low blood sugar (25). Will re-attempt this afternoon as able/appropriate.

## 2020-08-21 NOTE — CM/SW NOTE
08/21/20 1600   CM/SW Referral Data   Referral Source Physician   Reason for Referral Discharge planning   Patient Info   Patient's Mental Status Alert;Oriented   Patient's Home Environment Condo/Apt no elevator   Number of Levels in Home 1   Patient li

## 2020-08-21 NOTE — OCCUPATIONAL THERAPY NOTE
Orders received for OT evaluation. Chart reviewed. Attempted to see pt. Spoke with RN. Pt with low blood sugar. RN requested hold for now. Will reschedule for later as able.      India Amezcua MA, OTR/L  Occupational Therapist

## 2020-08-21 NOTE — PROGRESS NOTES
Went over continuous glucose monitor policy agreement with patient. Patient is agreeable. CGM placed at [facility] abd. Signed form placed on patient's chart.

## 2020-08-22 LAB
GLUCOSE BLDC GLUCOMTR-MCNC: 154 MG/DL (ref 70–99)
GLUCOSE BLDC GLUCOMTR-MCNC: 237 MG/DL (ref 70–99)
GLUCOSE BLDC GLUCOMTR-MCNC: 283 MG/DL (ref 70–99)
GLUCOSE BLDC GLUCOMTR-MCNC: 413 MG/DL (ref 70–99)

## 2020-08-22 PROCEDURE — 99214 OFFICE O/P EST MOD 30 MIN: CPT | Performed by: HOSPITALIST

## 2020-08-22 NOTE — PHYSICAL THERAPY NOTE
PHYSICAL THERAPY TREATMENT NOTE - INPATIENT     Room Number: 207/917-P       Presenting Problem: s/p L patella ORIF (fx from a fall at home)    Problem List  Principal Problem:    Left patella fracture  Active Problems:    Type 1 diabetes ([address]    Hypothyr FORM - BASIC MOBILITY  How much difficulty does the patient currently have. ..  -   Turning over in bed (including adjusting bedclothes, sheets and blankets)?: A Little   -   Sitting down on and standing up from a chair with arms (e.g., wheelchair, bedside independence with home activity/exercise instructions provided to patient in preparation for discharge.    Goal #5   Current Status In progress/instructed   Goal #6    Goal #6  Current Status

## 2020-08-22 NOTE — PLAN OF CARE
Problem: PAIN - ADULT  Goal: Verbalizes/displays adequate comfort level or patient's stated pain goal  Description  INTERVENTIONS:  - Encourage pt to monitor pain and request assistance  - Assess pain using appropriate pain scale  - Administer analgesics caregiver  - Include patient/family/discharge partner in discharge planning  - Arrange for needed discharge resources and transportation as appropriate  - Identify discharge learning needs (meds, wound care, etc)  - Arrange for interpreters to assist at [facility] intact.

## 2020-08-22 NOTE — PROGRESS NOTES
[facility] - [facility]    Progress Note    Adan Filler Patient Status:  Outpatient in a Bed    1950 MRN [id number]   Location [facility] 4W/SW/SE Attending Sana Torres MD   Hosp Day # 0 PCP Radhika Anderson MD       Subjective:   Alin Bradford Oral BID   • aspirin  81 mg Oral BID   • anastrozole  1 mg Oral Daily   • insulin detemir  11 Units Subcutaneous Nightly   • Insulin Aspart Pen  3 Units Subcutaneous TID CC   • Levothyroxine Sodium  100 mcg Oral Once per day on Mon Tue Wed Thu Fri Sat   •

## 2020-08-22 NOTE — PROGRESS NOTES
[facility]    Progress Note    Jose Alejandro Kern Patient Status:  Outpatient in a Bed    1950 MRN [id number]   Location [facility] 4W/SW/SE Attending James Carranza MD   Hosp Day # 0 PCP Dorota Ware MD       Subjective:   David Mccloud (CST): Natty Haro MD on 8/21/2020 at 7:15 AM                  Lennox Nissen, MD  8/22/2020

## 2020-08-23 LAB
ANION GAP SERPL CALC-SCNC: 6 MMOL/L (ref 0–18)
BASOPHILS # BLD AUTO: 0.02 X10(3) UL (ref 0–0.2)
BASOPHILS NFR BLD AUTO: 0.5 %
BUN BLD-MCNC: 8 MG/DL (ref 7–18)
BUN/CREAT SERPL: 11.8 (ref 10–20)
CALCIUM BLD-MCNC: 8.8 MG/DL (ref 8.5–10.1)
CHLORIDE SERPL-SCNC: 100 MMOL/L (ref 98–112)
CO2 SERPL-SCNC: 32 MMOL/L (ref 21–32)
CREAT BLD-MCNC: 0.68 MG/DL (ref 0.55–1.02)
DEPRECATED RDW RBC AUTO: 44.6 FL (ref 35.1–46.3)
EOSINOPHIL # BLD AUTO: 0.13 X10(3) UL (ref 0–0.7)
EOSINOPHIL NFR BLD AUTO: 3.2 %
ERYTHROCYTE [DISTWIDTH] IN BLOOD BY AUTOMATED COUNT: 11.7 % (ref 11–15)
GLUCOSE BLD-MCNC: 151 MG/DL (ref 70–99)
GLUCOSE BLDC GLUCOMTR-MCNC: 178 MG/DL (ref 70–99)
GLUCOSE BLDC GLUCOMTR-MCNC: 234 MG/DL (ref 70–99)
GLUCOSE BLDC GLUCOMTR-MCNC: 238 MG/DL (ref 70–99)
GLUCOSE BLDC GLUCOMTR-MCNC: 317 MG/DL (ref 70–99)
HCT VFR BLD AUTO: 29.5 % (ref 35–48)
HGB BLD-MCNC: 10.3 G/DL (ref 12–16)
IMM GRANULOCYTES # BLD AUTO: 0.01 X10(3) UL (ref 0–1)
IMM GRANULOCYTES NFR BLD: 0.2 %
LYMPHOCYTES # BLD AUTO: 0.81 X10(3) UL (ref 1–4)
LYMPHOCYTES NFR BLD AUTO: 19.7 %
MCH RBC QN AUTO: 36 PG (ref 26–34)
MCHC RBC AUTO-ENTMCNC: 34.9 G/DL (ref 31–37)
MCV RBC AUTO: 103.1 FL (ref 80–100)
MONOCYTES # BLD AUTO: 0.4 X10(3) UL (ref 0.1–1)
MONOCYTES NFR BLD AUTO: 9.7 %
NEUTROPHILS # BLD AUTO: 2.75 X10 (3) UL (ref 1.5–7.7)
NEUTROPHILS # BLD AUTO: 2.75 X10(3) UL (ref 1.5–7.7)
NEUTROPHILS NFR BLD AUTO: 66.7 %
OSMOLALITY SERPL CALC.SUM OF ELEC: 287 MOSM/KG (ref 275–295)
PLATELET # BLD AUTO: 194 10(3)UL (ref 150–450)
POTASSIUM SERPL-SCNC: 3.6 MMOL/L (ref 3.5–5.1)
RBC # BLD AUTO: 2.86 X10(6)UL (ref 3.8–5.3)
SODIUM SERPL-SCNC: 138 MMOL/L (ref 136–145)
WBC # BLD AUTO: 4.1 X10(3) UL (ref 4–11)

## 2020-08-23 PROCEDURE — 99214 OFFICE O/P EST MOD 30 MIN: CPT | Performed by: HOSPITALIST

## 2020-08-23 NOTE — PROGRESS NOTES
[facility]    Progress Note    [place] Alert Patient Status:  Outpatient in a Bed    1950 MRN [id number]   Location [facility] 4W/SW/SE Attending Nyla Rosario MD   Hosp Day # 0 PCP Armand Birch MD       Subjective:   Ivonne Ignacio Daily   • insulin detemir  11 Units Subcutaneous Nightly   • Insulin Aspart Pen  3 Units Subcutaneous TID CC   • Levothyroxine Sodium  100 mcg Oral Once per day on Mon Tue Wed Thu Fri Sat   • Metoprolol Succinate ER  25 mg Oral Daily     glucose **OR** Glu

## 2020-08-23 NOTE — PHYSICAL THERAPY NOTE
PHYSICAL THERAPY TREATMENT NOTE - INPATIENT     Room Number: 334/944-M       Presenting Problem: s/p L patella ORIF (fx from a fall at home)    Problem List  Principal Problem:    Left patella fracture  Active Problems:    Type 1 diabetes ([address]    Hypothyr Automatic  Patient Position: Lying    O2 WALK  SPO2 on Room Air at Rest: 96               AM-PAC '6-Clicks' [address]  How much difficulty does the patient currently have. ..  -   Turning over in bed (including adjusting bedcloth Goal #3   Current Status 10 ft with RW with Min A   Goal #4    Goal #4   Current Status    Goal #5 Patient to demonstrate independence with home activity/exercise instructions provided to patient in preparation for discharge.    Goal #5   Current Status I

## 2020-08-23 NOTE — PLAN OF CARE
Problem: PAIN - ADULT  Goal: Verbalizes/displays adequate comfort level or patient's stated pain goal  Description  INTERVENTIONS:  - Encourage pt to monitor pain and request assistance  - Assess pain using appropriate pain scale  - Administer analgesics resources  Description  INTERVENTIONS:  - Identify barriers to discharge w/pt and caregiver  - Include patient/family/discharge partner in discharge planning  - Arrange for needed discharge resources and transportation as appropriate  - Identify discharge scheduled insulin  plus 6 units sliding scale

## 2020-08-23 NOTE — PROGRESS NOTES
[facility]    Progress Note    [place] Alert Patient Status:  Outpatient in a Bed    1950 MRN [id number]   Location [facility] 4W/SW/SE Attending Nyla Rosario MD   Hosp Day # 0 PCP Armand Birch MD       Subjective:   Ivonne Ignacio

## 2020-08-24 ENCOUNTER — APPOINTMENT (OUTPATIENT)
Dept: HEMATOLOGY/ONCOLOGY | Facility: HOSPITAL | Age: 70
End: 2020-08-24
Attending: INTERNAL MEDICINE
Payer: MEDICARE

## 2020-08-24 VITALS
OXYGEN SATURATION: 99 % | SYSTOLIC BLOOD PRESSURE: 108 MMHG | RESPIRATION RATE: 18 BRPM | BODY MASS INDEX: 21.52 KG/M2 | HEART RATE: 86 BPM | DIASTOLIC BLOOD PRESSURE: 50 MMHG | WEIGHT: 142 LBS | HEIGHT: 68 IN | TEMPERATURE: 98 F

## 2020-08-24 LAB
GLUCOSE BLDC GLUCOMTR-MCNC: 139 MG/DL (ref 70–99)
GLUCOSE BLDC GLUCOMTR-MCNC: 237 MG/DL (ref 70–99)

## 2020-08-24 PROCEDURE — 99217 OBSERVATION CARE DISCHARGE: CPT | Performed by: HOSPITALIST

## 2020-08-24 RX ORDER — HYDROCODONE BITARTRATE AND ACETAMINOPHEN 5; 325 MG/1; MG/1
1 TABLET ORAL EVERY 6 HOURS PRN
Qty: 30 TABLET | Refills: 0 | Status: SHIPPED | OUTPATIENT
Start: 2020-08-24 | End: 2020-09-09

## 2020-08-24 RX ORDER — ASPIRIN 81 MG/1
81 TABLET ORAL 2 TIMES DAILY WITH MEALS
Qty: 50 TABLET | Refills: 0 | Status: SHIPPED | OUTPATIENT
Start: 2020-08-24 | End: 2020-09-08 | Stop reason: ALTCHOICE

## 2020-08-24 NOTE — PHYSICAL THERAPY NOTE
PHYSICAL THERAPY TREATMENT NOTE - INPATIENT     Room Number: 335/506-C       Presenting Problem: s/p L patella ORIF (fx from a fall at home)    Problem List  Principal Problem:    Left patella fracture  Active Problems:    Type 1 diabetes ([address]    Hypothyr Static Sitting: Good  Dynamic Sitting: Fair +           Static Standing: Fair -  Dynamic Standing: Poor +    ACTIVITY TOLERANCE                         O2 WALK                  AM-PAC '6-Clicks' INPATIENT SHORT FORM - BASIC MOBILITY  How much with RW with Min A   Goal #4    Goal #4   Current Status    Goal #5 Patient to demonstrate independence with home activity/exercise instructions provided to patient in preparation for discharge.    Goal #5   Current Status In progress/instructed   Goal #6

## 2020-08-24 NOTE — CM/SW NOTE
TROY spoke to Clifton at [facility], asking if they would have a bed available for pt today, as it is pt's first choice for SINCERE placement. Clifton stated that they are requesting updates in Aidin to determine. TROY extended Aidin deadline.  Updates were sen

## 2020-08-24 NOTE — PLAN OF CARE
Problem: Patient Centered Care  Goal: Patient preferences are identified and integrated in the patient's plan of care  Description  Interventions:  - What would you like us to know as we care for you?   - Provide timely, complete, and accurate informatio pain  - Anticipate increased pain with activity and pre-medicate as appropriate  8/24/2020 1505 by Black Carbone RN  Outcome: Adequate for Discharge  8/24/2020 1016 by Black Carbone RN  Outcome: Progressing     Problem: RISK FOR INFECTION - ADULT  Goal: to Case Management Department for coordinating discharge planning if the patient needs post-hospital services based on physician/LIP order or complex needs related to functional status, cognitive ability or social support system  8/24/2020 1505 by Regina Zafar

## 2020-08-24 NOTE — PROGRESS NOTES
[facility]    Progress Note    Roxann Pennherman Patient Status:  Outpatient in a Bed    1950 MRN [id number]   Location [facility] 4W/SW/SE Attending Hong Noland MD   Hosp Day # 0 PCP Miryam Junior MD        Subjective: found.                  Sugey Garcia MD  8/24/2020

## 2020-08-24 NOTE — PLAN OF CARE
Pt is POD #3-4. Vital signs within normal limits. PRN Norco provided for pain. Alert and oriented x4. Baseline neuropathy present to BLE. On room air. Tolerating general diet. HS accucheck 317. Scheduled 11 units of Levemir provided.  Dr. Pascale Melendez notified wi Anticipate increased pain with activity and pre-medicate as appropriate  Outcome: Progressing     Problem: RISK FOR INFECTION - ADULT  Goal: Absence of fever/infection during anticipated neutropenic period  Description  INTERVENTIONS  - Monitor WBC  - Admi prescribed range  Description  INTERVENTIONS:  - Monitor Blood Glucose as ordered  - Assess for signs and symptoms of hyperglycemia and hypoglycemia  - Administer ordered medications to maintain glucose within target range  - Assess barriers to adequate nu

## 2020-08-24 NOTE — DISCHARGE SUMMARY
[facility]    Discharge Summary    Jessica Ogden Patient Status:  Outpatient in a Bed    1950 MRN [id number]   Location [facility] 4W/SW/SE Attending Nancy Hopper MD   Hosp Day # 0 PCP Armando De La Cruz MD     Date of Admis essential  CONT HOME MEDS, MONITOR,.        History of breast cancer  CONT HOME MEDS.       Complications: none       Surgical Procedures     Case IDs Date Procedure Surgeon Location Status    [id number] 8/20/20 PATELLA OPEN REDUCTION INTERNAL FIXATION Jessica Matias mg total) by mouth every 6 (six) hours as needed.     !! Insulin Pen Needle (BD PEN NEEDLE SHORT U/F) 31G X 8 MM Does not apply Misc  USE UP TO 5 TIMES EVERY DAY    Continuous Blood Gluc Sensor (DEXCOM G6 SENSOR) Does not apply Misc  Use as directed, change 31G X 8 MM Misc  Commonly known as:  BD Pen Needle Short U/F      USE UP TO 5 TIMES EVERY DAY   Quantity:  450 each  Refills:  3     cyanocobalamin 1000 MCG/ML Soln  Commonly known as:  VITAMIN B12      Inject 1 mL (1,000 mcg total) into the skin every 30 5-325 MG Tabs         Follow up: Follow-up Information     Susan Cotto MD In 2 weeks. Specialty:  SURGERY, ORTHOPEDIC  Contact information:  [address]  [phone]             Lilian Barton MD In 1 we incision and are generally not concerning. Activity:  [x] Elevate your knee to the level of your heart as often as possible.  [] Work on maintaining your knee extension, starting immediately after surgery.   [] Work on straight leg raises, starting immed

## 2020-08-24 NOTE — CM/SW NOTE
08/24/20 1347   Discharge disposition   Expected discharge disposition Skilled Nurs   Name of [address]   Patient is Discharged to a [address] [place], [place] [address]d Yes   Discharge transportation [address] [facility]     Pt stable to

## 2020-08-25 ENCOUNTER — INITIAL APN SNF VISIT (OUTPATIENT)
Dept: INTERNAL MEDICINE CLINIC | Facility: SKILLED NURSING FACILITY | Age: 70
End: 2020-08-25

## 2020-08-25 VITALS
SYSTOLIC BLOOD PRESSURE: 139 MMHG | WEIGHT: 142 LBS | TEMPERATURE: 97 F | RESPIRATION RATE: 20 BRPM | OXYGEN SATURATION: 98 % | BODY MASS INDEX: 22 KG/M2 | HEART RATE: 92 BPM | DIASTOLIC BLOOD PRESSURE: 63 MMHG

## 2020-08-25 DIAGNOSIS — M81.0 AGE-RELATED OSTEOPOROSIS WITHOUT CURRENT PATHOLOGICAL FRACTURE: ICD-10-CM

## 2020-08-25 DIAGNOSIS — S82.042D CLOSED DISPLACED COMMINUTED FRACTURE OF LEFT PATELLA WITH ROUTINE HEALING, SUBSEQUENT ENCOUNTER: ICD-10-CM

## 2020-08-25 DIAGNOSIS — Z85.3 HISTORY OF BREAST CANCER: Chronic | ICD-10-CM

## 2020-08-25 DIAGNOSIS — I10 HYPERTENSION, ESSENTIAL: ICD-10-CM

## 2020-08-25 DIAGNOSIS — E10.9 TYPE 1 DIABETES MELLITUS WITHOUT COMPLICATION (HCC): ICD-10-CM

## 2020-08-25 PROCEDURE — 1123F ACP DISCUSS/DSCN MKR DOCD: CPT | Performed by: NURSE PRACTITIONER

## 2020-08-25 PROCEDURE — 1111F DSCHRG MED/CURRENT MED MERGE: CPT | Performed by: NURSE PRACTITIONER

## 2020-08-25 PROCEDURE — 99310 SBSQ NF CARE HIGH MDM 45: CPT | Performed by: NURSE PRACTITIONER

## 2020-08-25 NOTE — PROGRESS NOTES
Wolf Dowell  : 1950  Age 78year old  female patient is admitted to [place] [address] for rehab and conditioning      Chief complaint: left Patellar fracture , s/p  ORIF     [facility] Admission : 20 to 20    HPI  81year old female who wa Laterality Date   • ANKLE FRACTURE SURGERY Right 08/2018    in [place]. (plate placed).  [facility] [address] [facility] [facility]    Dr Rashel Horowitz Right 11/2018    plate placed for fx proximal tib-fi • traMADol HCl 50 MG Oral Tab Take 1 tablet (50 mg total) by mouth every 6 (six) hours as needed. 12 tablet 0   • traMADol HCl 50 MG Oral Tab Take 1 tablet (50 mg total) by mouth every 6 (six) hours as needed.  12 tablet 0   • Insulin Lispro, 1 Unit Dial, CARDIOVASCULAR:denies chest pain, no palpitations   GI: denies nausea, vomiting, constipation, diarrhea; no rectal bleeding; no heartburn  :no dysuria, urgency or frequency; no vaginal discharge; no urinary incontinence; no hematuria  MUSCULOSKELETAL:l po q 6 prn    -brace in place  -logan hose on   -dressing in place   -weekly cbc and bmp in rehab, due in am   -monitor      2.  Urinary retention  -encouraged ambulation  -Monitor urine output  -Chairez discontinued in hospital   -keep a close eye on urination

## 2020-08-27 ENCOUNTER — SNF VISIT (OUTPATIENT)
Dept: INTERNAL MEDICINE CLINIC | Facility: SKILLED NURSING FACILITY | Age: 70
End: 2020-08-27

## 2020-08-27 VITALS
OXYGEN SATURATION: 98 % | RESPIRATION RATE: 20 BRPM | TEMPERATURE: 98 F | BODY MASS INDEX: 22 KG/M2 | WEIGHT: 142 LBS | DIASTOLIC BLOOD PRESSURE: 68 MMHG | SYSTOLIC BLOOD PRESSURE: 128 MMHG | HEART RATE: 95 BPM

## 2020-08-27 DIAGNOSIS — M81.0 AGE-RELATED OSTEOPOROSIS WITHOUT CURRENT PATHOLOGICAL FRACTURE: ICD-10-CM

## 2020-08-27 DIAGNOSIS — E10.9 TYPE 1 DIABETES MELLITUS WITHOUT COMPLICATION (HCC): ICD-10-CM

## 2020-08-27 DIAGNOSIS — Z85.3 HISTORY OF BREAST CANCER: Chronic | ICD-10-CM

## 2020-08-27 DIAGNOSIS — S82.042D CLOSED DISPLACED COMMINUTED FRACTURE OF LEFT PATELLA WITH ROUTINE HEALING, SUBSEQUENT ENCOUNTER: ICD-10-CM

## 2020-08-27 DIAGNOSIS — I10 HYPERTENSION, ESSENTIAL: ICD-10-CM

## 2020-08-27 PROCEDURE — 99309 SBSQ NF CARE MODERATE MDM 30: CPT | Performed by: NURSE PRACTITIONER

## 2020-08-27 NOTE — PROGRESS NOTES
Haroon Martel  : 1950  Age 78year old  female patient is admitted to [place] [address] for rehab and conditioning       Chief complaint: left Patellar fracture , s/p  ORIF      [facility] Admission : 20 to 20     HPI  81year old female who 30 tablet 0   • aspirin 81 MG Oral Tab EC Take 1 tablet (81 mg total) by mouth 2 (two) times daily with meals. Start dinner tonight then breakfast/dinner for 12 days.  50 tablet 0   • HYDROcodone-acetaminophen 5-325 MG Oral Tab Take 1 tablet by mouth every VITALS:  /68   Pulse 95   Temp 97.5 °F (36.4 °C) (Tympanic)   Resp 20   Wt 142 lb (64.4 kg)   SpO2 98%   BMI 21.59 kg/m²       REVIEW OF SYSTEMS:  GENERAL HEALTH:feels well otherwise  SKIN: denies any unusual skin lesions or rashes  WOUNDS: inc commands  PSYCHIATRIC: alert and oriented x 3; affect appropriate        SEE PLAN BELOW  1.  Left patella fracture  -S/P left patellar fracture open reduction internal fixation  -will need f/u with Dr. Boy Boyle, discussed with RN   - ASA DVT PROPHYLAXI

## 2020-09-01 ENCOUNTER — SNF VISIT (OUTPATIENT)
Dept: INTERNAL MEDICINE CLINIC | Facility: SKILLED NURSING FACILITY | Age: 70
End: 2020-09-01

## 2020-09-01 VITALS
HEART RATE: 84 BPM | RESPIRATION RATE: 20 BRPM | TEMPERATURE: 98 F | BODY MASS INDEX: 22 KG/M2 | DIASTOLIC BLOOD PRESSURE: 59 MMHG | SYSTOLIC BLOOD PRESSURE: 116 MMHG | OXYGEN SATURATION: 98 % | WEIGHT: 142 LBS

## 2020-09-01 DIAGNOSIS — M81.0 AGE-RELATED OSTEOPOROSIS WITHOUT CURRENT PATHOLOGICAL FRACTURE: ICD-10-CM

## 2020-09-01 DIAGNOSIS — I10 HYPERTENSION, ESSENTIAL: ICD-10-CM

## 2020-09-01 DIAGNOSIS — E10.9 TYPE 1 DIABETES MELLITUS WITHOUT COMPLICATION (HCC): ICD-10-CM

## 2020-09-01 DIAGNOSIS — S82.042D CLOSED DISPLACED COMMINUTED FRACTURE OF LEFT PATELLA WITH ROUTINE HEALING, SUBSEQUENT ENCOUNTER: ICD-10-CM

## 2020-09-01 PROCEDURE — 99309 SBSQ NF CARE MODERATE MDM 30: CPT | Performed by: NURSE PRACTITIONER

## 2020-09-01 NOTE — PROGRESS NOTES
Caitlyn Martinez  : 1950  Age 78year old  female patient is admitted to [place] [address] for rehab and conditioning       Chief complaint: left Patellar fracture , s/p  ORIF      [address] [facility] Admission : 20 to 20     HPI  81year old female who as needed for Pain. 30 tablet 0   • aspirin 81 MG Oral Tab EC Take 1 tablet (81 mg total) by mouth 2 (two) times daily with meals. Start dinner tonight then breakfast/dinner for 12 days.  60 tablet 0   • traMADol HCl 50 MG Oral Tab Take 1 tablet (50 mg tota leg, brace in place    EYES:no visual complaints or deficits  HENT: denies nasal congestion, sinus pain or sore throat;  RESPIRATORY: denies shortness of breath, wheezing or cough   CARDIOVASCULAR:denies chest pain, no palpitations   GI: denies nausea, vom - continue Pain control with norco 5mg /325mg  Prn q 6 or Tramadol 50mg po q 6 prn or Tylenol 650 mg po q 6 prn    -brace in place  -logan hose on   -dressing in place   -weekly cbc and bmp in rehab   -monitor      2. Urinary retention- appears resolved

## 2020-09-02 ENCOUNTER — SNF VISIT (OUTPATIENT)
Dept: INTERNAL MEDICINE CLINIC | Facility: SKILLED NURSING FACILITY | Age: 70
End: 2020-09-02

## 2020-09-02 VITALS
SYSTOLIC BLOOD PRESSURE: 132 MMHG | BODY MASS INDEX: 22 KG/M2 | TEMPERATURE: 97 F | RESPIRATION RATE: 20 BRPM | HEART RATE: 68 BPM | DIASTOLIC BLOOD PRESSURE: 65 MMHG | WEIGHT: 143 LBS | OXYGEN SATURATION: 97 %

## 2020-09-02 DIAGNOSIS — S82.042D CLOSED DISPLACED COMMINUTED FRACTURE OF LEFT PATELLA WITH ROUTINE HEALING, SUBSEQUENT ENCOUNTER: ICD-10-CM

## 2020-09-02 DIAGNOSIS — E03.9 HYPOTHYROIDISM (ACQUIRED): ICD-10-CM

## 2020-09-02 DIAGNOSIS — I10 HYPERTENSION, ESSENTIAL: ICD-10-CM

## 2020-09-02 DIAGNOSIS — M81.0 AGE-RELATED OSTEOPOROSIS WITHOUT CURRENT PATHOLOGICAL FRACTURE: ICD-10-CM

## 2020-09-02 DIAGNOSIS — E10.9 TYPE 1 DIABETES MELLITUS WITHOUT COMPLICATION (HCC): ICD-10-CM

## 2020-09-02 PROCEDURE — 99308 SBSQ NF CARE LOW MDM 20: CPT | Performed by: NURSE PRACTITIONER

## 2020-09-02 NOTE — PROGRESS NOTES
Kavon Streeter  : 1950  Age 78year old  female patient is admitted to [place] [address] 20 for rehab and conditioning       Chief complaint: left Patellar fracture , s/p  ORIF      [facility] Admission : 20 to 20     HPI  81year old female who tonight then breakfast/dinner for 12 days. 50 tablet 0   • HYDROcodone-acetaminophen 5-325 MG Oral Tab Take 1 tablet by mouth every 6 (six) hours as needed for Pain.  30 tablet 0   • aspirin 81 MG Oral Tab EC Take 1 tablet (81 mg total) by mouth 2 (two) lisa kg/m²       REVIEW OF SYSTEMS:    GENERAL HEALTH:feels well otherwise  SKIN: denies any unusual skin lesions or rashes  WOUNDS: incision to the left leg, brace in place    EYES:no visual complaints or deficits  HENT: denies nasal congestion, sinus pain or patellar fracture open reduction internal fixation  - f/u with Dr. German Quinn next week Tuesday 9/8/20   - ASA DVT PROPHYLAXIS  - PT/OT   -vs q shift   - continue Pain control with norco 5mg /325mg  Prn q 6 or Tramadol 50mg po q 6 prn or Tylenol 650 mg

## 2020-09-08 ENCOUNTER — SNF VISIT (OUTPATIENT)
Dept: INTERNAL MEDICINE CLINIC | Facility: SKILLED NURSING FACILITY | Age: 70
End: 2020-09-08

## 2020-09-08 ENCOUNTER — TELEPHONE (OUTPATIENT)
Dept: ORTHOPEDICS CLINIC | Facility: CLINIC | Age: 70
End: 2020-09-08

## 2020-09-08 ENCOUNTER — OFFICE VISIT (OUTPATIENT)
Dept: ORTHOPEDICS CLINIC | Facility: CLINIC | Age: 70
End: 2020-09-08
Payer: MEDICARE

## 2020-09-08 ENCOUNTER — HOSPITAL ENCOUNTER (OUTPATIENT)
Dept: GENERAL RADIOLOGY | Facility: HOSPITAL | Age: 70
Discharge: HOME | End: 2020-09-08
Attending: ORTHOPAEDIC SURGERY | Admitting: ORTHOPAEDIC SURGERY
Payer: MEDICARE

## 2020-09-08 VITALS
RESPIRATION RATE: 20 BRPM | WEIGHT: 143 LBS | BODY MASS INDEX: 22 KG/M2 | TEMPERATURE: 97 F | DIASTOLIC BLOOD PRESSURE: 64 MMHG | HEART RATE: 66 BPM | SYSTOLIC BLOOD PRESSURE: 133 MMHG | OXYGEN SATURATION: 98 %

## 2020-09-08 DIAGNOSIS — E10.9 TYPE 1 DIABETES MELLITUS WITHOUT COMPLICATION (HCC): ICD-10-CM

## 2020-09-08 DIAGNOSIS — I10 HYPERTENSION, ESSENTIAL: ICD-10-CM

## 2020-09-08 DIAGNOSIS — M81.0 AGE-RELATED OSTEOPOROSIS WITHOUT CURRENT PATHOLOGICAL FRACTURE: ICD-10-CM

## 2020-09-08 DIAGNOSIS — S82.032P: Primary | ICD-10-CM

## 2020-09-08 DIAGNOSIS — S82.042G CLOSED DISPLACED COMMINUTED FRACTURE OF LEFT PATELLA WITH DELAYED HEALING, SUBSEQUENT ENCOUNTER: ICD-10-CM

## 2020-09-08 DIAGNOSIS — R45.89 EMOTIONAL UPSET: ICD-10-CM

## 2020-09-08 DIAGNOSIS — Z47.89 ORTHOPEDIC AFTERCARE: ICD-10-CM

## 2020-09-08 DIAGNOSIS — Z85.3 HISTORY OF BREAST CANCER: Chronic | ICD-10-CM

## 2020-09-08 PROCEDURE — G0463 HOSPITAL OUTPT CLINIC VISIT: HCPCS | Performed by: ORTHOPAEDIC SURGERY

## 2020-09-08 PROCEDURE — 73560 X-RAY EXAM OF KNEE 1 OR 2: CPT | Performed by: ORTHOPAEDIC SURGERY

## 2020-09-08 PROCEDURE — 99310 SBSQ NF CARE HIGH MDM 45: CPT | Performed by: NURSE PRACTITIONER

## 2020-09-08 PROCEDURE — 99024 POSTOP FOLLOW-UP VISIT: CPT | Performed by: ORTHOPAEDIC SURGERY

## 2020-09-08 NOTE — PROGRESS NOTES
Duaine Schlatter  : 1950  Age 78year old  female patient is admitted to [place] [address]  20 for rehab and conditioning       Chief complaint: left Patellar fracture , s/p  ORIF , not healing well- needs to return to surgery , upset     [address] Outpatient Medications   Medication Sig Dispense Refill   • HYDROcodone-acetaminophen 5-325 MG Oral Tab Take 1 tablet by mouth every 6 (six) hours as needed for Pain.  30 tablet 0   • HYDROcodone-acetaminophen 5-325 MG Oral Tab Take 1 tablet by mouth every rashes  WOUNDS: incision to the left leg, brace in place    EYES:no visual complaints or deficits  HENT: denies nasal congestion, sinus pain or sore throat;  RESPIRATORY: denies shortness of breath, wheezing or cough   CARDIOVASCULAR:denies chest pain, no Dr. Lady Mcgowan today  9/8/20, screw loose, not a good xray, needs to return to surgery 9/11/20    - ASA DVT PROPHYLAXIS  - PT/OT   -vs q shift   - continue Pain control with norco 5mg /325mg  Prn q 6 or Tramadol 50mg po q 6 prn or Tylenol 650 mg po q 6

## 2020-09-08 NOTE — TELEPHONE ENCOUNTER
Faxed surgery sheet to [facility] OR.   Referral put in the system for:   Left patella fracture failed internall fixation

## 2020-09-09 RX ORDER — TRAMADOL HYDROCHLORIDE 50 MG/1
50 TABLET ORAL EVERY 6 HOURS PRN
Status: ON HOLD | COMMUNITY
End: 2020-09-14

## 2020-09-09 RX ORDER — DOCUSATE SODIUM 100 MG/1
100 CAPSULE, LIQUID FILLED ORAL 2 TIMES DAILY PRN
COMMUNITY
End: 2020-11-06

## 2020-09-09 NOTE — H&P
NURSING INTAKE COMMENTS: Patient presents with:  Post-Op: 1st post op visit from left patella fracture open reduction internal fixation on 8/20. Stts she has minor pain. Stts she's been going to OT and PT every day at [facility] with improvement.        HP MASTECTOMY RIGHT  03/2017    in [place] for bilateral breast cancer   • PATELLA OPEN REDUCTION INTERNAL FIXATION Left 8/20/2020    Performed by Ernesto Tolbert MD at [facility] OR     Current Outpatient Medications   Medication Sig Dispense Refill   • Relation Age of Onset   • Heart Disease Mother          age 80 CHF   • Colon Cancer Mother 66   • Other (parkinsons disease) Father          early [de-identified].    • Other (1 brother, 1 son, 2 daughters) Other        Social History    Occupational History with no extensor lag.     Imaging: Xr Knee (1 Or 2 Views), Left (cpt=73560)    Result Date: 9/8/2020  PROCEDURE: XR KNEE (1 OR 2 VIEWS), LEFT (CPT=73560)  COMPARISON: [facility], XR KNEE (1 OR 2 VIEWS), LEFT (CPT=73560), 8/16/2020, 7:41 PM. INDICATIONS: Left patella fracture open reduction internal fixation. TECHNIQUE:  Intraoperative fluoroscopy/imaging. CONCLUSION:  FLUOROSCOPY TIME: 41.1 sec # OF FLUOROGRAPHIC IMAGES: 3  Fluoroscopy and imaging was provided in the operating room. system, but the main goal of surgery would be stabilization for fracture. I will schedule her for surgery later this week. The above note was creating using Dragon speech recognition technology. Please excuse any typos.     Ganesh Parsons MD

## 2020-09-09 NOTE — H&P (VIEW-ONLY)
NURSING INTAKE COMMENTS: Patient presents with:  Post-Op: 1st post op visit from left patella fracture open reduction internal fixation on 8/20. Stts she has minor pain. Stts she's been going to OT and PT every day at [facility] with improvement.        HP MASTECTOMY RIGHT  03/2017    in [place] for bilateral breast cancer   • PATELLA OPEN REDUCTION INTERNAL FIXATION Left 8/20/2020    Performed by Juliet Ulrich MD at [address] [place] [address] MAIN OR     Current Outpatient Medications   Medication Sig Dispense Refill   • Relation Age of Onset   • Heart Disease Mother          age 80 CHF   • Colon Cancer Mother 66   • Other (parkinsons disease) Father          early [de-identified].    • Other (1 brother, 1 son, 2 daughters) Other        Social History    Occupational History with no extensor lag.     Imaging: Xr Knee (1 Or 2 Views), Left (cpt=73560)    Result Date: 9/8/2020  PROCEDURE: XR KNEE (1 OR 2 VIEWS), LEFT (CPT=73560)  COMPARISON: [facility], XR KNEE (1 OR 2 VIEWS), LEFT (CPT=73560), 8/16/2020, 7:41 PM. INDICATIONS: Left patella fracture open reduction internal fixation. TECHNIQUE:  Intraoperative fluoroscopy/imaging. CONCLUSION:  FLUOROSCOPY TIME: 41.1 sec # OF FLUOROGRAPHIC IMAGES: 3  Fluoroscopy and imaging was provided in the operating room. system, but the main goal of surgery would be stabilization for fracture. I will schedule her for surgery later this week. The above note was creating using Dragon speech recognition technology. Please excuse any typos.     Bob Noguera MD

## 2020-09-10 ENCOUNTER — TELEPHONE (OUTPATIENT)
Dept: ENDOCRINOLOGY CLINIC | Facility: CLINIC | Age: 70
End: 2020-09-10

## 2020-09-10 ENCOUNTER — SNF VISIT (OUTPATIENT)
Dept: INTERNAL MEDICINE CLINIC | Facility: SKILLED NURSING FACILITY | Age: 70
End: 2020-09-10

## 2020-09-10 VITALS
SYSTOLIC BLOOD PRESSURE: 126 MMHG | HEART RATE: 84 BPM | WEIGHT: 143 LBS | RESPIRATION RATE: 20 BRPM | BODY MASS INDEX: 22 KG/M2 | DIASTOLIC BLOOD PRESSURE: 57 MMHG | TEMPERATURE: 98 F | OXYGEN SATURATION: 98 %

## 2020-09-10 DIAGNOSIS — S82.042K CLOSED DISPLACED COMMINUTED FRACTURE OF LEFT PATELLA WITH NONUNION, SUBSEQUENT ENCOUNTER: ICD-10-CM

## 2020-09-10 DIAGNOSIS — E10.9 TYPE 1 DIABETES MELLITUS WITHOUT COMPLICATION (HCC): ICD-10-CM

## 2020-09-10 DIAGNOSIS — I10 HYPERTENSION, ESSENTIAL: ICD-10-CM

## 2020-09-10 DIAGNOSIS — Z85.3 HISTORY OF BREAST CANCER: Chronic | ICD-10-CM

## 2020-09-10 DIAGNOSIS — M81.0 AGE-RELATED OSTEOPOROSIS WITHOUT CURRENT PATHOLOGICAL FRACTURE: ICD-10-CM

## 2020-09-10 PROCEDURE — 99310 SBSQ NF CARE HIGH MDM 45: CPT | Performed by: NURSE PRACTITIONER

## 2020-09-10 NOTE — TELEPHONE ENCOUNTER
Adalberto Martins states pt is having knee surgery tomorrow and they need to know if she can take her Lantus tonight and also needs info about sliding scale of Humolog.  Please advise

## 2020-09-10 NOTE — TELEPHONE ENCOUNTER
Discussed regimen with Dr. Michelle Moreau. Per Dr. Michelle Moreau if patient takes lantus at night take 6 units, if she takes lantus in the morning take 5 units. Spoke to Fiona Amado from [facility] and she states patient is actually taking 14 units of lantus QHS.  Dr. Michelle Moreau wa

## 2020-09-10 NOTE — PROGRESS NOTES
Nuvia Bairon  : 1950  Age 78year old  female patient is admitted to [place] [address]  [address] for rehab and conditioning       Chief complaint: left Patellar fracture , s/p  ORIF , not healing well- needs to return to surgery in am  , upset, Type 1 hours as needed for Pain. • Magnesium Hydroxide (MILK OF MAGNESIA OR) Take by mouth. • docusate sodium 100 MG Oral Cap Take 100 mg by mouth 2 (two) times daily as needed for constipation.      • HYDROcodone-acetaminophen 5-325 MG Oral Tab Take 1 tab constipation, diarrhea; no rectal bleeding; no heartburn  :no dysuria, urgency or frequency; no vaginal discharge; no urinary incontinence; no hematuria  MUSCULOSKELETAL:left leg pain s/p surgery    NEURO:no sensory or motor complaint  PSYCHE: no symptom 6 prn or Tylenol 650 mg po q 6 prn    -brace in place/dressing in place  -logan hose on   -weekly cbc and bmp in rehab   -monitor      2. Urinary retention- appears resolved   -encouraged ambulation  -Monitor urine output  -Chairez discontinued in hospital   -

## 2020-09-10 NOTE — TELEPHONE ENCOUNTER
She takes lantus in am, correct? Decrease Lantus to 5 units SQ QAM tomorrow. No Humalog in the morning before procedure. Thanks.

## 2020-09-10 NOTE — TELEPHONE ENCOUNTER
Pt is having knee surgery tomorrow. Bel Cuellar from [facility] asking about DM regimen. DM Regimen:   Lantus 11 units SQ daily  Humalog 4-8 units SQ TID with meals -->she is self adjusting based on BG level  (under 100 3 units; 100-130 4units. ..)    Please a

## 2020-09-11 ENCOUNTER — APPOINTMENT (OUTPATIENT)
Dept: GENERAL RADIOLOGY | Facility: HOSPITAL | Age: 70
DRG: 517 | End: 2020-09-11
Attending: ORTHOPAEDIC SURGERY
Payer: MEDICARE

## 2020-09-11 ENCOUNTER — ANESTHESIA (OUTPATIENT)
Dept: SURGERY | Facility: HOSPITAL | Age: 70
DRG: 517 | End: 2020-09-11
Payer: MEDICARE

## 2020-09-11 ENCOUNTER — ANESTHESIA EVENT (OUTPATIENT)
Dept: SURGERY | Facility: HOSPITAL | Age: 70
DRG: 517 | End: 2020-09-11
Payer: MEDICARE

## 2020-09-11 ENCOUNTER — HOSPITAL ENCOUNTER (INPATIENT)
Facility: HOSPITAL | Age: 70
LOS: 3 days | Discharge: NURSING, REHAB OR LONG-TERM CARE | DRG: 517 | End: 2020-09-14
Attending: ORTHOPAEDIC SURGERY | Admitting: ORTHOPAEDIC SURGERY
Payer: MEDICARE

## 2020-09-11 DIAGNOSIS — Z01.818 PRE-OP TESTING: Primary | ICD-10-CM

## 2020-09-11 PROBLEM — S82.002P: Status: ACTIVE | Noted: 2020-09-11

## 2020-09-11 LAB
GLUCOSE BLDC GLUCOMTR-MCNC: 103 MG/DL (ref 70–99)
GLUCOSE BLDC GLUCOMTR-MCNC: 291 MG/DL (ref 70–99)
GLUCOSE BLDC GLUCOMTR-MCNC: 296 MG/DL (ref 70–99)
GLUCOSE BLDC GLUCOMTR-MCNC: 354 MG/DL (ref 70–99)
HGB BLD-MCNC: 11.4 G/DL (ref 12–16)
MRSA DNA SPEC QL NAA+PROBE: NEGATIVE
SARS-COV-2 RNA RESP QL NAA+PROBE: NOT DETECTED

## 2020-09-11 PROCEDURE — 99232 SBSQ HOSP IP/OBS MODERATE 35: CPT | Performed by: HOSPITALIST

## 2020-09-11 PROCEDURE — 0QSF04Z REPOSITION LEFT PATELLA WITH INTERNAL FIXATION DEVICE, OPEN APPROACH: ICD-10-PCS | Performed by: ORTHOPAEDIC SURGERY

## 2020-09-11 PROCEDURE — 27524 TREAT KNEECAP FRACTURE: CPT | Performed by: ORTHOPAEDIC SURGERY

## 2020-09-11 PROCEDURE — 76000 FLUOROSCOPY <1 HR PHYS/QHP: CPT | Performed by: ORTHOPAEDIC SURGERY

## 2020-09-11 DEVICE — CABLE READY ASSEMBLY 1.8X635: Type: IMPLANTABLE DEVICE | Site: KNEE | Status: FUNCTIONAL

## 2020-09-11 RX ORDER — HYDROMORPHONE HYDROCHLORIDE 1 MG/ML
0.4 INJECTION, SOLUTION INTRAMUSCULAR; INTRAVENOUS; SUBCUTANEOUS EVERY 5 MIN PRN
Status: DISCONTINUED | OUTPATIENT
Start: 2020-09-11 | End: 2020-09-11 | Stop reason: HOSPADM

## 2020-09-11 RX ORDER — DEXTROSE MONOHYDRATE 25 G/50ML
50 INJECTION, SOLUTION INTRAVENOUS
Status: DISCONTINUED | OUTPATIENT
Start: 2020-09-11 | End: 2020-09-11 | Stop reason: HOSPADM

## 2020-09-11 RX ORDER — FAMOTIDINE 20 MG/1
20 TABLET ORAL ONCE
Status: DISCONTINUED | OUTPATIENT
Start: 2020-09-11 | End: 2020-09-11 | Stop reason: HOSPADM

## 2020-09-11 RX ORDER — HYDROMORPHONE HYDROCHLORIDE 1 MG/ML
0.6 INJECTION, SOLUTION INTRAMUSCULAR; INTRAVENOUS; SUBCUTANEOUS EVERY 5 MIN PRN
Status: DISCONTINUED | OUTPATIENT
Start: 2020-09-11 | End: 2020-09-11 | Stop reason: HOSPADM

## 2020-09-11 RX ORDER — SODIUM CHLORIDE, SODIUM LACTATE, POTASSIUM CHLORIDE, CALCIUM CHLORIDE 600; 310; 30; 20 MG/100ML; MG/100ML; MG/100ML; MG/100ML
INJECTION, SOLUTION INTRAVENOUS CONTINUOUS
Status: DISCONTINUED | OUTPATIENT
Start: 2020-09-11 | End: 2020-09-12

## 2020-09-11 RX ORDER — MIDAZOLAM HYDROCHLORIDE 1 MG/ML
INJECTION INTRAMUSCULAR; INTRAVENOUS AS NEEDED
Status: DISCONTINUED | OUTPATIENT
Start: 2020-09-11 | End: 2020-09-11 | Stop reason: SURG

## 2020-09-11 RX ORDER — INSULIN LISPRO 100 [IU]/ML
3 INJECTION, SOLUTION INTRAVENOUS; SUBCUTANEOUS
Status: DISCONTINUED | OUTPATIENT
Start: 2020-09-12 | End: 2020-09-11

## 2020-09-11 RX ORDER — DEXAMETHASONE SODIUM PHOSPHATE 4 MG/ML
VIAL (ML) INJECTION AS NEEDED
Status: DISCONTINUED | OUTPATIENT
Start: 2020-09-11 | End: 2020-09-11 | Stop reason: SURG

## 2020-09-11 RX ORDER — MORPHINE SULFATE 10 MG/ML
6 INJECTION, SOLUTION INTRAMUSCULAR; INTRAVENOUS EVERY 10 MIN PRN
Status: DISCONTINUED | OUTPATIENT
Start: 2020-09-11 | End: 2020-09-11 | Stop reason: HOSPADM

## 2020-09-11 RX ORDER — HYDROMORPHONE HYDROCHLORIDE 1 MG/ML
0.4 INJECTION, SOLUTION INTRAMUSCULAR; INTRAVENOUS; SUBCUTANEOUS EVERY 2 HOUR PRN
Status: DISCONTINUED | OUTPATIENT
Start: 2020-09-11 | End: 2020-09-14

## 2020-09-11 RX ORDER — MORPHINE SULFATE 4 MG/ML
2 INJECTION, SOLUTION INTRAMUSCULAR; INTRAVENOUS EVERY 10 MIN PRN
Status: DISCONTINUED | OUTPATIENT
Start: 2020-09-11 | End: 2020-09-11 | Stop reason: HOSPADM

## 2020-09-11 RX ORDER — ACETAMINOPHEN 325 MG/1
650 TABLET ORAL EVERY 4 HOURS PRN
Status: DISCONTINUED | OUTPATIENT
Start: 2020-09-11 | End: 2020-09-14

## 2020-09-11 RX ORDER — ONDANSETRON 2 MG/ML
4 INJECTION INTRAMUSCULAR; INTRAVENOUS ONCE AS NEEDED
Status: DISCONTINUED | OUTPATIENT
Start: 2020-09-11 | End: 2020-09-11 | Stop reason: HOSPADM

## 2020-09-11 RX ORDER — HYDROMORPHONE HYDROCHLORIDE 1 MG/ML
0.2 INJECTION, SOLUTION INTRAMUSCULAR; INTRAVENOUS; SUBCUTANEOUS EVERY 5 MIN PRN
Status: DISCONTINUED | OUTPATIENT
Start: 2020-09-11 | End: 2020-09-11 | Stop reason: HOSPADM

## 2020-09-11 RX ORDER — ASPIRIN 325 MG
325 TABLET ORAL 2 TIMES DAILY
Status: DISCONTINUED | OUTPATIENT
Start: 2020-09-11 | End: 2020-09-14

## 2020-09-11 RX ORDER — HYDROCODONE BITARTRATE AND ACETAMINOPHEN 5; 325 MG/1; MG/1
1 TABLET ORAL AS NEEDED
Status: DISCONTINUED | OUTPATIENT
Start: 2020-09-11 | End: 2020-09-11 | Stop reason: HOSPADM

## 2020-09-11 RX ORDER — HYDROCODONE BITARTRATE AND ACETAMINOPHEN 5; 325 MG/1; MG/1
2 TABLET ORAL EVERY 4 HOURS PRN
Status: DISCONTINUED | OUTPATIENT
Start: 2020-09-11 | End: 2020-09-14

## 2020-09-11 RX ORDER — POLYETHYLENE GLYCOL 3350 17 G/17G
17 POWDER, FOR SOLUTION ORAL DAILY PRN
Status: DISCONTINUED | OUTPATIENT
Start: 2020-09-11 | End: 2020-09-14

## 2020-09-11 RX ORDER — HYDROCODONE BITARTRATE AND ACETAMINOPHEN 5; 325 MG/1; MG/1
1 TABLET ORAL EVERY 4 HOURS PRN
Status: DISCONTINUED | OUTPATIENT
Start: 2020-09-11 | End: 2020-09-14

## 2020-09-11 RX ORDER — METOPROLOL TARTRATE 5 MG/5ML
2.5 INJECTION INTRAVENOUS ONCE
Status: DISCONTINUED | OUTPATIENT
Start: 2020-09-11 | End: 2020-09-11 | Stop reason: HOSPADM

## 2020-09-11 RX ORDER — LEVOTHYROXINE SODIUM 0.1 MG/1
100 TABLET ORAL
Status: DISCONTINUED | OUTPATIENT
Start: 2020-09-12 | End: 2020-09-14

## 2020-09-11 RX ORDER — MORPHINE SULFATE 4 MG/ML
4 INJECTION, SOLUTION INTRAMUSCULAR; INTRAVENOUS EVERY 10 MIN PRN
Status: DISCONTINUED | OUTPATIENT
Start: 2020-09-11 | End: 2020-09-11 | Stop reason: HOSPADM

## 2020-09-11 RX ORDER — HALOPERIDOL 5 MG/ML
0.25 INJECTION INTRAMUSCULAR ONCE AS NEEDED
Status: DISCONTINUED | OUTPATIENT
Start: 2020-09-11 | End: 2020-09-11 | Stop reason: HOSPADM

## 2020-09-11 RX ORDER — ANASTROZOLE 1 MG/1
1 TABLET ORAL DAILY
Status: DISCONTINUED | OUTPATIENT
Start: 2020-09-12 | End: 2020-09-14

## 2020-09-11 RX ORDER — HYDROCODONE BITARTRATE AND ACETAMINOPHEN 5; 325 MG/1; MG/1
2 TABLET ORAL AS NEEDED
Status: DISCONTINUED | OUTPATIENT
Start: 2020-09-11 | End: 2020-09-11 | Stop reason: HOSPADM

## 2020-09-11 RX ORDER — BISACODYL 10 MG
10 SUPPOSITORY, RECTAL RECTAL
Status: DISCONTINUED | OUTPATIENT
Start: 2020-09-11 | End: 2020-09-14

## 2020-09-11 RX ORDER — DEXTROSE MONOHYDRATE 50 MG/ML
INJECTION, SOLUTION INTRAVENOUS CONTINUOUS
Status: DISCONTINUED | OUTPATIENT
Start: 2020-09-11 | End: 2020-09-12

## 2020-09-11 RX ORDER — NALOXONE HYDROCHLORIDE 0.4 MG/ML
80 INJECTION, SOLUTION INTRAMUSCULAR; INTRAVENOUS; SUBCUTANEOUS AS NEEDED
Status: DISCONTINUED | OUTPATIENT
Start: 2020-09-11 | End: 2020-09-11 | Stop reason: HOSPADM

## 2020-09-11 RX ORDER — CEFAZOLIN SODIUM/WATER 2 G/20 ML
2 SYRINGE (ML) INTRAVENOUS ONCE
Status: COMPLETED | OUTPATIENT
Start: 2020-09-11 | End: 2020-09-11

## 2020-09-11 RX ORDER — SODIUM PHOSPHATE, DIBASIC AND SODIUM PHOSPHATE, MONOBASIC 7; 19 G/133ML; G/133ML
1 ENEMA RECTAL ONCE AS NEEDED
Status: DISCONTINUED | OUTPATIENT
Start: 2020-09-11 | End: 2020-09-14

## 2020-09-11 RX ORDER — DOCUSATE SODIUM 100 MG/1
100 CAPSULE, LIQUID FILLED ORAL 2 TIMES DAILY
Status: DISCONTINUED | OUTPATIENT
Start: 2020-09-11 | End: 2020-09-14

## 2020-09-11 RX ORDER — INSULIN ASPART 100 [IU]/ML
INJECTION, SOLUTION INTRAVENOUS; SUBCUTANEOUS ONCE
Status: COMPLETED | OUTPATIENT
Start: 2020-09-11 | End: 2020-09-11

## 2020-09-11 RX ORDER — METOCLOPRAMIDE 10 MG/1
10 TABLET ORAL ONCE
Status: DISCONTINUED | OUTPATIENT
Start: 2020-09-11 | End: 2020-09-11 | Stop reason: HOSPADM

## 2020-09-11 RX ORDER — CEFAZOLIN SODIUM/WATER 2 G/20 ML
2 SYRINGE (ML) INTRAVENOUS EVERY 8 HOURS
Status: COMPLETED | OUTPATIENT
Start: 2020-09-11 | End: 2020-09-12

## 2020-09-11 RX ORDER — PROCHLORPERAZINE EDISYLATE 5 MG/ML
5 INJECTION INTRAMUSCULAR; INTRAVENOUS ONCE AS NEEDED
Status: DISCONTINUED | OUTPATIENT
Start: 2020-09-11 | End: 2020-09-11 | Stop reason: HOSPADM

## 2020-09-11 RX ORDER — HYDROMORPHONE HYDROCHLORIDE 1 MG/ML
0.2 INJECTION, SOLUTION INTRAMUSCULAR; INTRAVENOUS; SUBCUTANEOUS EVERY 2 HOUR PRN
Status: DISCONTINUED | OUTPATIENT
Start: 2020-09-11 | End: 2020-09-14

## 2020-09-11 RX ORDER — ACETAMINOPHEN 500 MG
1000 TABLET ORAL ONCE
Status: COMPLETED | OUTPATIENT
Start: 2020-09-11 | End: 2020-09-11

## 2020-09-11 RX ORDER — METOPROLOL SUCCINATE 25 MG/1
25 TABLET, EXTENDED RELEASE ORAL DAILY
Status: DISCONTINUED | OUTPATIENT
Start: 2020-09-12 | End: 2020-09-14

## 2020-09-11 RX ORDER — SODIUM CHLORIDE, SODIUM LACTATE, POTASSIUM CHLORIDE, CALCIUM CHLORIDE 600; 310; 30; 20 MG/100ML; MG/100ML; MG/100ML; MG/100ML
INJECTION, SOLUTION INTRAVENOUS CONTINUOUS
Status: DISCONTINUED | OUTPATIENT
Start: 2020-09-11 | End: 2020-09-11 | Stop reason: HOSPADM

## 2020-09-11 RX ORDER — ONDANSETRON 2 MG/ML
INJECTION INTRAMUSCULAR; INTRAVENOUS AS NEEDED
Status: DISCONTINUED | OUTPATIENT
Start: 2020-09-11 | End: 2020-09-11 | Stop reason: SURG

## 2020-09-11 RX ORDER — HYDROMORPHONE HYDROCHLORIDE 1 MG/ML
0.8 INJECTION, SOLUTION INTRAMUSCULAR; INTRAVENOUS; SUBCUTANEOUS EVERY 2 HOUR PRN
Status: DISCONTINUED | OUTPATIENT
Start: 2020-09-11 | End: 2020-09-14

## 2020-09-11 RX ADMIN — SODIUM CHLORIDE, SODIUM LACTATE, POTASSIUM CHLORIDE, CALCIUM CHLORIDE: 600; 310; 30; 20 INJECTION, SOLUTION INTRAVENOUS at 16:21:00

## 2020-09-11 RX ADMIN — DEXAMETHASONE SODIUM PHOSPHATE 4 MG: 4 MG/ML VIAL (ML) INJECTION at 14:34:00

## 2020-09-11 RX ADMIN — CEFAZOLIN SODIUM/WATER 2 G: 2 G/20 ML SYRINGE (ML) INTRAVENOUS at 14:36:00

## 2020-09-11 RX ADMIN — MIDAZOLAM HYDROCHLORIDE 2 MG: 1 INJECTION INTRAMUSCULAR; INTRAVENOUS at 14:27:00

## 2020-09-11 RX ADMIN — ONDANSETRON 4 MG: 2 INJECTION INTRAMUSCULAR; INTRAVENOUS at 14:34:00

## 2020-09-11 NOTE — ANESTHESIA PREPROCEDURE EVALUATION
Anesthesia PreOp Note    HPI:     Glory Deutsch is a 78year old female who presents for preoperative consultation requested by: Juliet lUrich MD    Date of Surgery: 9/11/2020    Procedure(s):  PATELLA OPEN REDUCTION INTERNAL FIXATION  Indication: l since age 17       Past Surgical History:   Procedure Laterality Date   • ANKLE FRACTURE SURGERY Right 08/2018    in [place]. (plate placed).  [facility] [address] [facility] [facility]    Dr John Thompson   • [address] [place] [address] SENSOR) Does not apply Misc, Use as directed, change sensors every 10 days. , Disp: 3 each, Rfl: 3, Taking  ERGOCALCIFEROL 1.25 MG (34799 UT) Oral Cap, TAKE 1 CAPSULE ONCE A WEEK, Disp: 12 capsule, Rfl: 3, 9/7/2020  insulin glargine (LANTUS SOLOSTAR) 100 UN • Colon Cancer Mother 66   • Other (parkinsons disease) Father          early [de-identified]. • Other (1 brother, 1 son, 2 daughters) Other      Social History    Socioeconomic History      Marital status:        Spouse name: Not on file      Number of (L) 08/23/2020    HCT 29.5 (L) 08/23/2020    .1 (H) 08/23/2020    MCH 36.0 (H) 08/23/2020    MCHC 34.9 08/23/2020    RDW 11.7 08/23/2020    .0 08/23/2020     Lab Results   Component Value Date     08/23/2020    K 3.6 08/23/2020    CL 10

## 2020-09-11 NOTE — ANESTHESIA PROCEDURE NOTES
Airway  Date/Time: 9/11/2020 2:25 PM  Urgency: Elective    Airway not difficult    General Information and Staff    Patient location during procedure: OR  Anesthesiologist: Chris Dow MD  Performed: anesthesiologist     Indications and Patient Conditio

## 2020-09-11 NOTE — INTERVAL H&P NOTE
Pre-op Diagnosis: left patella fracture, failed open reduction internal fixation    The above referenced H&P was reviewed by Chivo Taylor MD on 9/11/2020, the patient was examined and no significant changes have occurred in the patient's condition s

## 2020-09-11 NOTE — PROGRESS NOTES
[facility] HOSP - [facility]    Progress Note    Leigh Ann Robles Patient Status:  Inpatient    1950 MRN [id number]   Location [address] UNIT Attending Helio Laws MD   Hosp Day # 0 PCP Damien Dakins, MD Armond Jenkins ([facility] [address]  CONT HOME MEDS, MONITOR ACCU CHECKS. Hypothyroidism (acquired)  CONT HOME MEDS. Hypertension, essential  CONT HOME MEDS, MONITOR. Hypercholesterolemia  CONT HOME MEDS. History of breast cancer  CONT ANASTROZOLE.              R

## 2020-09-11 NOTE — OPERATIVE REPORT
[facility] OPERATING ROOM  Operative Note     Zoanne Blade Location: OR   CSN [id number] MRN [id number]   Admission Date 9/11/2020 Operation Date 9/11/2020   Attending Physician Ernesto Tolbert MD Operating Physician Carmelita Enrique MD supine position. A timeout was performed, patient identifies her correct patient, left knee was identified as correct procedure site and this was verified with the consent. The patient was prepped and draped in the usual fashion.   She was given IV antibi MD  9/11/2020  4:21 PM

## 2020-09-12 LAB
GLUCOSE BLDC GLUCOMTR-MCNC: 181 MG/DL (ref 70–99)
GLUCOSE BLDC GLUCOMTR-MCNC: 211 MG/DL (ref 70–99)
GLUCOSE BLDC GLUCOMTR-MCNC: 239 MG/DL (ref 70–99)
GLUCOSE BLDC GLUCOMTR-MCNC: 338 MG/DL (ref 70–99)
HGB BLD-MCNC: 10.8 G/DL (ref 12–16)

## 2020-09-12 PROCEDURE — 99232 SBSQ HOSP IP/OBS MODERATE 35: CPT | Performed by: HOSPITALIST

## 2020-09-12 RX ORDER — DEXTROSE MONOHYDRATE 25 G/50ML
50 INJECTION, SOLUTION INTRAVENOUS
Status: DISCONTINUED | OUTPATIENT
Start: 2020-09-12 | End: 2020-09-14

## 2020-09-12 NOTE — PROGRESS NOTES
[facility]    Progress Note    Vera Shall Patient Status:  Inpatient    1950 MRN [id number]   Location [facility] 4W/SW/SE Attending Willis Dozier MD   Hosp Day # 1 PCP Panda Martinez MD        Subjective:   Jael Majano CREATSERUM 0.68 08/23/2020    BUN 8 08/23/2020     08/23/2020    K 3.6 08/23/2020     08/23/2020    CO2 32.0 08/23/2020     (H) 08/23/2020    CA 8.8 08/23/2020    ALB 3.6 07/01/2020    ALKPHO 132 07/01/2020    BILT 1.1 07/01/2020    TP 8

## 2020-09-12 NOTE — OCCUPATIONAL THERAPY NOTE
OCCUPATIONAL THERAPY EVALUATION - INPATIENT      Room Number: 405/405-A  Evaluation Date: 9/12/2020  Type of Evaluation: Initial  Presenting Problem: patella ORIF    Physician Order: IP Consult to Occupational Therapy  Reason for Therapy: ADL/IADL Dysfunct Research supports that patients with this level of impairment may benefit from subacute rehab.     DISCHARGE RECOMMENDATIONS  OT Discharge Recommendations: Sub-acute rehabilitation  OT Device Recommendations: Shower chair    PLAN  OT Treatment Plan: Balance placed for fx proximal tib-fib fx--Dr Aiyana Salgado. in [address] LEFT  03/2017    in [place] for bilateral breast cancer   • MASTECTOMY RIGHT  03/2017    in [place] for bilateral breast cancer   • [address]  toilet, bedpan or urinal? : A Lot  -   Putting on and taking off regular upper body clothing?: A Little  -   Taking care of personal grooming such as brushing teeth?: A Little  -   Eating meals?: A Little    AM-PAC Score:  Score: 16  Approx Degree of Impai

## 2020-09-12 NOTE — PHYSICAL THERAPY NOTE
PHYSICAL THERAPY EVALUATION - INPATIENT     Room Number: 405/405-A  Evaluation Date: 9/12/2020  Type of Evaluation: Initial   Physician Order: PT Eval and Treat    Presenting Problem: L patellar ORIF  Reason for Therapy: Mobility Dysfunction and Discharge THERAPY MEDICAL/SOCIAL HISTORY     History related to current admission: admitted from [facility], fx not healing     Problem List  Principal Problem:    Closed displaced fracture of left patella with malunion  Active Problems:    Type 1 diabetes ([facility] [address]    H Enter : 0     Stairs to Bedroom: 0       Lives With: Alone  Drives: No  Patient Owned Equipment: (rollator, raised toilet seat, grab bars shower)  Patient Regularly Uses: None    Prior Level of [place]: prior to stay at [place], indep with ADLs, has Standardized Score (AM-PAC Scale): 42.13   CMS Modifier (G-Code): CK    FUNCTIONAL ABILITY STATUS  Gait Assessment   Gait Assistance: Contact guard assist(with cues for WB status)  Distance (ft): 2x15  Assistive Device: Rolling walker  Pattern: L Decreas

## 2020-09-12 NOTE — PROGRESS NOTES
[facility]    Progress Note    Monet Webb Patient Status:  Inpatient    1950 MRN [id number]   Location [address] UNIT Attending Mera Lucio MD   Hosp Day # 1 PCP MD Zeeshan Griggs PT/O.       Type 1 diabetes ([facility] [address]  CONT HOME MEDS, MONITOR ACCU CHECKS.   Patient tends to be more aggressive with her insulin and we have been in the hospital.  Will take into consideration her input for insulin dosing current blood sugars are high because

## 2020-09-13 LAB
GLUCOSE BLDC GLUCOMTR-MCNC: 149 MG/DL (ref 70–99)
GLUCOSE BLDC GLUCOMTR-MCNC: 198 MG/DL (ref 70–99)
GLUCOSE BLDC GLUCOMTR-MCNC: 222 MG/DL (ref 70–99)
GLUCOSE BLDC GLUCOMTR-MCNC: 266 MG/DL (ref 70–99)

## 2020-09-13 PROCEDURE — 99232 SBSQ HOSP IP/OBS MODERATE 35: CPT | Performed by: HOSPITALIST

## 2020-09-13 NOTE — PLAN OF CARE
Problem: Patient Centered Care  Goal: Patient preferences are identified and integrated in the patient's plan of care  Description  Interventions:  - What would you like us to know as we care for you?   - Provide timely, complete, and accurate informatio management  - Manage/alleviate anxiety  - Utilize distraction and/or relaxation techniques  - Monitor for opioid side effects  - Notify MD/LIP if interventions unsuccessful or patient reports new pain  - Anticipate increased pain with activity and pre-medi physician/LIP order or complex needs related to functional status, cognitive ability or social support system  Outcome: Progressing   Patient tolerating activity well, pain controlled with PO medication, possible discharge to [facility]

## 2020-09-13 NOTE — PHYSICAL THERAPY NOTE
Chart reviewed. Pt cleared to participate per RULA Garsia. Pt just returned to bed after using the bathroom, planning on ordering lunch. Pt requested later time, will reattempt as able.

## 2020-09-13 NOTE — PROGRESS NOTES
[facility]    Progress Note    Abdelrahman Rhoadesradjeny Patient Status:  Inpatient    1950 MRN [id number]   Location [facility] 4W/SW/SE Attending Win Herrera MD   Hosp Day # 2 PCP Randal Isbell MD        Subjective:   Falguni Brandt 10.8 (L) 09/12/2020    HCT 29.5 (L) 08/23/2020    .0 08/23/2020    CREATSERUM 0.68 08/23/2020    BUN 8 08/23/2020     08/23/2020    K 3.6 08/23/2020     08/23/2020    CO2 32.0 08/23/2020     (H) 08/23/2020    CA 8.8 08/23/2020

## 2020-09-13 NOTE — PROGRESS NOTES
[facility] HOSP - [facility]    Progress Note    Burnis Amel Patient Status:  Inpatient    1950 MRN [id number]   Location [address] UNIT Attending Eliot Adams MD   Hosp Day # 2 PCP Prentice Hailey, MD Rickey Halsted PT/O.       Type 1 diabetes ([facility] [address]  CONT HOME MEDS, MONITOR ACCU CHECKS.   Patient tends to be more aggressive with her insulin and we have been in the hospital.  Will take into consideration her input for insulin dosing current blood sugars are high because

## 2020-09-14 VITALS
WEIGHT: 142 LBS | HEART RATE: 90 BPM | HEIGHT: 68 IN | RESPIRATION RATE: 18 BRPM | TEMPERATURE: 99 F | DIASTOLIC BLOOD PRESSURE: 61 MMHG | OXYGEN SATURATION: 99 % | SYSTOLIC BLOOD PRESSURE: 124 MMHG | BODY MASS INDEX: 21.52 KG/M2

## 2020-09-14 LAB
GLUCOSE BLDC GLUCOMTR-MCNC: 124 MG/DL (ref 70–99)
GLUCOSE BLDC GLUCOMTR-MCNC: 164 MG/DL (ref 70–99)
GLUCOSE BLDC GLUCOMTR-MCNC: 228 MG/DL (ref 70–99)
GLUCOSE BLDC GLUCOMTR-MCNC: 62 MG/DL (ref 70–99)
GLUCOSE BLDC GLUCOMTR-MCNC: 98 MG/DL (ref 70–99)

## 2020-09-14 PROCEDURE — 99232 SBSQ HOSP IP/OBS MODERATE 35: CPT | Performed by: HOSPITALIST

## 2020-09-14 RX ORDER — HYDROCODONE BITARTRATE AND ACETAMINOPHEN 5; 325 MG/1; MG/1
1 TABLET ORAL EVERY 6 HOURS PRN
Qty: 30 TABLET | Refills: 0 | Status: SHIPPED | OUTPATIENT
Start: 2020-09-14 | End: 2020-09-28 | Stop reason: ALTCHOICE

## 2020-09-14 RX ORDER — HYDROCODONE BITARTRATE AND ACETAMINOPHEN 5; 325 MG/1; MG/1
1 TABLET ORAL EVERY 4 HOURS PRN
Qty: 30 TABLET | Refills: 0 | Status: SHIPPED | OUTPATIENT
Start: 2020-09-14 | End: 2020-10-27

## 2020-09-14 NOTE — WOUND PROGRESS NOTE
Wound Care Services  Nursing consult to see the pt's left heel, she is resting in bed, heels are elevated, right heel is intact. Left leg cast intact, she has no c/o pain.  Left lateral heel with healing unstageable pressure injury, dry scab, no exudate and

## 2020-09-14 NOTE — PROGRESS NOTES
[facility]    Progress Note    Leeann Palumbo Patient Status:  Inpatient    1950 MRN [id number]   Location [facility] 4W/SW/SE Attending Lonny Becker MD   Hosp Day # 3 PCP Jazmin Muñoz MD        Subjective:   Amrit Talamantes

## 2020-09-14 NOTE — PLAN OF CARE
Problem: Patient Centered Care  Goal: Patient preferences are identified and integrated in the patient's plan of care  Description  Interventions:  - What would you like us to know as we care for you?   - Provide timely, complete, and accurate informatio assistance  - Assess pain using appropriate pain scale  - Administer analgesics based on type and severity of pain and evaluate response  - Implement non-pharmacological measures as appropriate and evaluate response  - Consider cultural and social influenc patient/family/discharge partner  - Complete POLST form as appropriate  - Assess patient's ability to be responsible for managing their own health  - Refer to Case Management Department for coordinating discharge planning if the patient needs post-hospital assessment.  - Educate pt/family on patient safety including physical limitations  - Instruct pt to call for assistance with activity based on assessment  - Modify environment to reduce risk of injury  - Provide assistive devices as appropriate  - Consider

## 2020-09-14 NOTE — DISCHARGE SUMMARY
[facility] HOSPITALIST  DISCHARGE SUMMARY     Cesar Vera Patient Status:  Inpatient    1950 MRN [id number]   Location [facility] 4W/SW/SE Attending Juan C Baron MD   Hosp Day # 3 PCP German Garcia MD     DATE OF ADMISSION: 2020  D rebound/guarding. Neuro: Normal reflexes, CN. Sensory/motor exams grossly normal deficit. Coordination  and gait normal.   MS: No joint effusions. No peripheral edema. L leg in cast  Skin: Skin is warm and dry. No rashes, erythema, diaphoresis.    Psych: Sodium 100 MCG Tabs  Commonly known as:  SYNTHROID      TAKE 1 TABLET 6 DAYS A WEEK   Quantity:  90 tablet  Refills:  3     Metoprolol Succinate ER 25 MG Tb24  Commonly known as:   Toprol XL      TAKE 1 TABLET DAILY   Quantity:  90 tablet  Refills:  3     M

## 2020-09-14 NOTE — PHYSICAL THERAPY NOTE
PHYSICAL THERAPY TREATMENT NOTE - INPATIENT     Room Number: 405/405-A       Presenting Problem: L patellar ORIF    Problem List  Principal Problem:    Closed displaced fracture of left patella with malunion  Active Problems:    Type 1 diabetes ([address]    Hy mechanics; Relaxation;Repositioning    BALANCE                                                                                                                     Static Sitting: Good  Dynamic Sitting: Fair +           Static Standing: Fair -  Dynamic Stand Patient is able to ambulate 50 feet with assist device: walker - rolling at assistance level: supervision   Goal #3   Current Status 15'x2 with the RW min/CGA   Goal #4    Goal #4   Current Status    Goal #5 Patient to demonstrate independence with home ac

## 2020-09-14 NOTE — PLAN OF CARE
AOx4. Up with RW x1 standby assist. Long leg cast left leg, 50% weight-bearing. Voiding freely; flatus, no BM. DM1 with CBG monitor. One episode of hypoglycemia without symptoms, treated with oral glucose per protocol. Pain managed with norco, 1 tab.  Aspir relaxation techniques  - Monitor for opioid side effects  - Notify MD/LIP if interventions unsuccessful or patient reports new pain  - Anticipate increased pain with activity and pre-medicate as appropriate  Outcome: Adequate for Discharge     Problem: RIS related to functional status, cognitive ability or social support system  Outcome: Adequate for Discharge

## 2020-09-14 NOTE — CM/SW NOTE
SW received message from RN that pt plans to DC to [facility]. SW performed chart review and sent the referral to [facility] in [address] [place] [address]. TROY called and spoke with pt over the phone who was aware and agreeable to the DC time and location.    TROY confirmed with RN that

## 2020-09-15 ENCOUNTER — INITIAL APN SNF VISIT (OUTPATIENT)
Dept: INTERNAL MEDICINE CLINIC | Facility: SKILLED NURSING FACILITY | Age: 70
End: 2020-09-15

## 2020-09-15 VITALS
SYSTOLIC BLOOD PRESSURE: 120 MMHG | TEMPERATURE: 97 F | WEIGHT: 143 LBS | RESPIRATION RATE: 20 BRPM | BODY MASS INDEX: 22 KG/M2 | OXYGEN SATURATION: 97 % | DIASTOLIC BLOOD PRESSURE: 65 MMHG | HEART RATE: 78 BPM

## 2020-09-15 DIAGNOSIS — S82.032P CLOSED DISPLACED TRANSVERSE FRACTURE OF LEFT PATELLA WITH MALUNION, SUBSEQUENT ENCOUNTER: ICD-10-CM

## 2020-09-15 DIAGNOSIS — Z85.3 HISTORY OF BREAST CANCER: Chronic | ICD-10-CM

## 2020-09-15 DIAGNOSIS — S82.042K CLOSED DISPLACED COMMINUTED FRACTURE OF LEFT PATELLA WITH NONUNION, SUBSEQUENT ENCOUNTER: ICD-10-CM

## 2020-09-15 DIAGNOSIS — E10.9 TYPE 1 DIABETES MELLITUS WITHOUT COMPLICATION (HCC): ICD-10-CM

## 2020-09-15 DIAGNOSIS — I10 HYPERTENSION, ESSENTIAL: ICD-10-CM

## 2020-09-15 PROCEDURE — 99310 SBSQ NF CARE HIGH MDM 45: CPT | Performed by: NURSE PRACTITIONER

## 2020-09-15 NOTE — PROGRESS NOTES
[facility]  : 1950  Age 78year old  female patient is admitted to [place] [address] for rehab and strengthening     Chief complaint:Closed displaced fracture of left patella with malunion,    Type 1 diabetes ,  Hypothyroidism ,  Hypertensio Disorder of thyroid     Hashimoto's   • Exposure to medical diagnostic radiation    • Fatty liver 2019    US liver 10/25/19 hepatic steatosis   • Gallstones 2019    US liver 10/25/19 hepatic steatosis, gallstones, 6 mm hemangioma vs dystrophic calcificatio use: Never      ALLERGIES:  No Known Allergies    CODE STATUS:  Full Code    ADVANCED CARE PLANNING TEAM: will need another  family care plan     CURRENT MEDICATIONS - reviewed and updated     Current Outpatient Medications   Medication Sig Dispense Refill otherwise  SKIN: denies any unusual skin lesions or rashes  WOUNDS: hard cast in place    EYES:no visual complaints or deficits  HENT: denies nasal congestion, sinus pain or sore throat;  RESPIRATORY: denies shortness of breath, wheezing or cough   CARDIOV patella with malunion  - S/p revision ORIF 9/11/20.   - followed by Dr. Lino Espino with walker.  -  F/U 2 weeks in ortho clinic, will discuss with RN to imelda   - ASA for DVT prophylaxis.   - PT/OT   -vs q shift   - continue Pain cont

## 2020-09-17 ENCOUNTER — SNF VISIT (OUTPATIENT)
Dept: INTERNAL MEDICINE CLINIC | Facility: SKILLED NURSING FACILITY | Age: 70
End: 2020-09-17

## 2020-09-17 VITALS
SYSTOLIC BLOOD PRESSURE: 136 MMHG | WEIGHT: 143 LBS | TEMPERATURE: 98 F | RESPIRATION RATE: 20 BRPM | OXYGEN SATURATION: 98 % | BODY MASS INDEX: 22 KG/M2 | DIASTOLIC BLOOD PRESSURE: 63 MMHG | HEART RATE: 85 BPM

## 2020-09-17 DIAGNOSIS — S82.042K CLOSED DISPLACED COMMINUTED FRACTURE OF LEFT PATELLA WITH NONUNION, SUBSEQUENT ENCOUNTER: ICD-10-CM

## 2020-09-17 DIAGNOSIS — S82.032P CLOSED DISPLACED TRANSVERSE FRACTURE OF LEFT PATELLA WITH MALUNION, SUBSEQUENT ENCOUNTER: ICD-10-CM

## 2020-09-17 DIAGNOSIS — I10 HYPERTENSION, ESSENTIAL: ICD-10-CM

## 2020-09-17 DIAGNOSIS — M81.0 AGE-RELATED OSTEOPOROSIS WITHOUT CURRENT PATHOLOGICAL FRACTURE: ICD-10-CM

## 2020-09-17 DIAGNOSIS — Z85.3 HISTORY OF BREAST CANCER: Chronic | ICD-10-CM

## 2020-09-17 DIAGNOSIS — E10.9 TYPE 1 DIABETES MELLITUS WITHOUT COMPLICATION (HCC): ICD-10-CM

## 2020-09-17 PROCEDURE — 99309 SBSQ NF CARE MODERATE MDM 30: CPT | Performed by: NURSE PRACTITIONER

## 2020-09-17 NOTE — PROGRESS NOTES
Monet Webb  : 1950  Age 78year old  female patient is admitted to [place] [address]  [address] for rehab and strengthening      Chief complaint:Closed displaced fracture of left patella with malunion,    Type 1 diabetes ,  Hypothyroidism ,  Hypertens Known Allergies    CODE STATUS:  Full Code    ADVANCED CARE PLANNING TEAM: will need another  family care plan , discharge plan 10/5/20    CURRENT MEDICATIONS - reviewed and updated     Current Outpatient Medications   Medication Sig Dispense Refill   • HY otherwise  SKIN: denies any unusual skin lesions or rashes  WOUNDS: hard cast in place    EYES:no visual complaints or deficits  HENT: denies nasal congestion, sinus pain or sore throat;  RESPIRATORY: denies shortness of breath, wheezing or cough   CARDIOV left patella with malunion  - S/p revision ORIF 9/11/20.   - followed by Dr. Nery Diaz with walker.  -long leg cast in place . Cms good  -  F/U 2 weeks in ortho clinic, will discuss with RN to [facility]   - ASA for DVT prophylaxis.   - P

## 2020-09-22 ENCOUNTER — SNF VISIT (OUTPATIENT)
Dept: INTERNAL MEDICINE CLINIC | Facility: SKILLED NURSING FACILITY | Age: 70
End: 2020-09-22

## 2020-09-22 VITALS
HEART RATE: 70 BPM | OXYGEN SATURATION: 98 % | TEMPERATURE: 97 F | RESPIRATION RATE: 20 BRPM | DIASTOLIC BLOOD PRESSURE: 67 MMHG | SYSTOLIC BLOOD PRESSURE: 120 MMHG | WEIGHT: 143 LBS | BODY MASS INDEX: 22 KG/M2

## 2020-09-22 DIAGNOSIS — S82.032P CLOSED DISPLACED TRANSVERSE FRACTURE OF LEFT PATELLA WITH MALUNION, SUBSEQUENT ENCOUNTER: ICD-10-CM

## 2020-09-22 DIAGNOSIS — E10.69 TYPE 1 DIABETES MELLITUS WITH OTHER SPECIFIED COMPLICATION (HCC): ICD-10-CM

## 2020-09-22 DIAGNOSIS — I10 HYPERTENSION, ESSENTIAL: ICD-10-CM

## 2020-09-22 DIAGNOSIS — R52 PAIN: ICD-10-CM

## 2020-09-22 PROCEDURE — 99309 SBSQ NF CARE MODERATE MDM 30: CPT | Performed by: NURSE PRACTITIONER

## 2020-09-22 NOTE — PROGRESS NOTES
Ad Stahl  : 1950  Age 78year old  female patient is admitted to [place] [address] for rehab and strengthening      Chief complaint:Closed displaced fracture of left patella with malunion,    Type 1 diabetes ,  Hypothyroidism ,  Hypertensi discharge plan 10/5/20    CURRENT MEDICATIONS - reviewed and updated     Current Outpatient Medications   Medication Sig Dispense Refill   • HYDROcodone-acetaminophen 5-325 MG Oral Tab Take 1 tablet by mouth every 4 (four) hours as needed.  30 tablet 0   • deficits  HENT: denies nasal congestion, sinus pain or sore throat;  RESPIRATORY: denies shortness of breath, wheezing or cough   CARDIOVASCULAR:denies chest pain, no palpitations   GI: denies nausea, vomiting, constipation, diarrhea; no rectal bleeding; n ortho clinic, appointment made per RN   - ASA for DVT prophylaxis.   - PT/OT   -vs q shift   - continue Pain control with norco 5mg /325mg  Prn q 6 or Tramadol 50mg po q 6 prn or Tylenol 650 mg po q 6 prn     -weekly cbc and bmp in rehab   -monitor   2. Uri

## 2020-09-28 ENCOUNTER — HOSPITAL ENCOUNTER (OUTPATIENT)
Dept: GENERAL RADIOLOGY | Facility: HOSPITAL | Age: 70
Discharge: HOME | End: 2020-09-28
Attending: ORTHOPAEDIC SURGERY
Payer: MEDICARE

## 2020-09-28 ENCOUNTER — OFFICE VISIT (OUTPATIENT)
Dept: ORTHOPEDICS CLINIC | Facility: CLINIC | Age: 70
End: 2020-09-28
Payer: MEDICARE

## 2020-09-28 VITALS — WEIGHT: 143 LBS | HEIGHT: 68 IN | BODY MASS INDEX: 21.67 KG/M2

## 2020-09-28 DIAGNOSIS — Z47.89 ORTHOPEDIC AFTERCARE: Primary | ICD-10-CM

## 2020-09-28 DIAGNOSIS — Z47.89 ORTHOPEDIC AFTERCARE: ICD-10-CM

## 2020-09-28 DIAGNOSIS — S82.032P: ICD-10-CM

## 2020-09-28 PROCEDURE — G0463 HOSPITAL OUTPT CLINIC VISIT: HCPCS | Performed by: ORTHOPAEDIC SURGERY

## 2020-09-28 PROCEDURE — 73560 X-RAY EXAM OF KNEE 1 OR 2: CPT | Performed by: ORTHOPAEDIC SURGERY

## 2020-09-28 PROCEDURE — 99024 POSTOP FOLLOW-UP VISIT: CPT | Performed by: ORTHOPAEDIC SURGERY

## 2020-09-28 NOTE — PROGRESS NOTES
NURSING INTAKE COMMENTS: Patient presents with:  Post-Op: fx of left patella ,patient has mild pain today ,patient is compliant with PT daily       HPI: This 78year old female presents today with complaints of follow-up status post revision left patella f Subcutaneous Solution Pen-injector Inject 14 Units into the skin nightly. 0   • aspirin 325 MG Oral Tab EC Take 1 tablet (325 mg total) by mouth 2 (two) times daily.  60 tablet 0   • docusate sodium 100 MG Oral Cap Take 100 mg by mouth 2 (two) times daily Substance and Sexual Activity      Alcohol use: Not Currently      Drug use: Never      Sexual activity: Not on file       Review of Systems:  GENERAL: feels generally well, no recent fevers or chills, no significant weight loss or weight gain  MUSCULOSKEL swelling. Chronic patellar tendinosis.      Dictated by (CST): Morgan Jones MD on 9/28/2020 at 11:27 AM     Finalized by (CST): Morgan Jones MD on 9/28/2020 at 11:28 AM          Xr Knee (1 Or 2 Views), Left (cpt=73560)    Result Date: 9/8/2020  PROCEDURE: time were used.  2 fluoroscopic images as well as a 1 page-dose summary image are stored with this exam.   Dictated by (CST): Shreyas Trent MD on 9/11/2020 at 5:44 PM     Finalized by (CST): Shreyas Trent MD on 9/11/2020 at 5:44 PM             Left knee AP an Staples removed and I placed her back into her knee range of motion brace locked in extension. I will have her follow-up in 1 week for skin check and scheduling for surgery based on the healing of her anterior skin.   She can be weightbearing as tolerated

## 2020-09-29 ENCOUNTER — SNF VISIT (OUTPATIENT)
Dept: INTERNAL MEDICINE CLINIC | Facility: SKILLED NURSING FACILITY | Age: 70
End: 2020-09-29

## 2020-09-29 VITALS
BODY MASS INDEX: 22 KG/M2 | WEIGHT: 144.19 LBS | SYSTOLIC BLOOD PRESSURE: 136 MMHG | OXYGEN SATURATION: 98 % | HEART RATE: 64 BPM | TEMPERATURE: 98 F | DIASTOLIC BLOOD PRESSURE: 62 MMHG | RESPIRATION RATE: 20 BRPM

## 2020-09-29 DIAGNOSIS — E10.69 TYPE 1 DIABETES MELLITUS WITH OTHER SPECIFIED COMPLICATION (HCC): ICD-10-CM

## 2020-09-29 DIAGNOSIS — Z02.9 DISCHARGE PLANNING ISSUES: ICD-10-CM

## 2020-09-29 DIAGNOSIS — S82.032P CLOSED DISPLACED TRANSVERSE FRACTURE OF LEFT PATELLA WITH MALUNION, SUBSEQUENT ENCOUNTER: ICD-10-CM

## 2020-09-29 DIAGNOSIS — Z85.3 HISTORY OF BREAST CANCER: Chronic | ICD-10-CM

## 2020-09-29 DIAGNOSIS — I10 HYPERTENSION, ESSENTIAL: ICD-10-CM

## 2020-09-29 PROCEDURE — 99310 SBSQ NF CARE HIGH MDM 45: CPT | Performed by: NURSE PRACTITIONER

## 2020-09-29 PROCEDURE — 1111F DSCHRG MED/CURRENT MED MERGE: CPT | Performed by: NURSE PRACTITIONER

## 2020-09-29 NOTE — PROGRESS NOTES
Vera Teagan  : 1950  Age 78year old  female patient is admitted to [place] [address]  20 for rehab and strengthening      Chief complaint:Closed displaced fracture of left patella with malunion,    Type 1 diabetes ,  Hypothyroidism ,  Hypertens at least 2 to 3 weeks. She discussed going for a 2nd opinion but she just isn't sure and she doesn't know why this is happening .     CMS good, no swelling noted.  Patient reports her blood sugar controlled and she knows how to managed it, has specific dose TO 5 TIMES EVERY  each 3   • Continuous Blood Gluc Sensor (DEXCOM G6 SENSOR) Does not apply Misc Use as directed, change sensors every 10 days.  3 each 3   • ERGOCALCIFEROL 1.25 MG (34323 UT) Oral Cap TAKE 1 CAPSULE ONCE A WEEK 12 capsule 3   • BD PE auscultation  CARDIOVASCULAR: S1, S2 normal, RRR; no S3, no S4;   ABDOMEN:  normal active BS+, soft, nondistended; no organomegaly, no masses; no bruits; nontender, no guarding, no rebound tenderness.   :no suprapubic distension  LYMPHATIC: left leg in ca po daily last admission , last day she took it was 9/7/20  -patient now off the Arimidex since going back to hospital per her request        - patient  reports is looking around for a new oncologist possibly and therapies to improve bone loss    This is a

## 2020-09-30 ENCOUNTER — SNF VISIT (OUTPATIENT)
Dept: INTERNAL MEDICINE CLINIC | Facility: SKILLED NURSING FACILITY | Age: 70
End: 2020-09-30

## 2020-09-30 VITALS
RESPIRATION RATE: 20 BRPM | TEMPERATURE: 97 F | WEIGHT: 138 LBS | DIASTOLIC BLOOD PRESSURE: 66 MMHG | OXYGEN SATURATION: 98 % | BODY MASS INDEX: 21 KG/M2 | SYSTOLIC BLOOD PRESSURE: 120 MMHG | HEART RATE: 66 BPM

## 2020-09-30 DIAGNOSIS — Z85.3 HISTORY OF BREAST CANCER: Chronic | ICD-10-CM

## 2020-09-30 DIAGNOSIS — E10.8 DM TYPE 1 CAUSING COMPLICATION (HCC): ICD-10-CM

## 2020-09-30 DIAGNOSIS — S82.032P CLOSED DISPLACED TRANSVERSE FRACTURE OF LEFT PATELLA WITH MALUNION, SUBSEQUENT ENCOUNTER: ICD-10-CM

## 2020-09-30 DIAGNOSIS — Z02.9 DISCHARGE PLANNING ISSUES: ICD-10-CM

## 2020-09-30 DIAGNOSIS — I10 HYPERTENSION, ESSENTIAL: ICD-10-CM

## 2020-09-30 PROCEDURE — 99309 SBSQ NF CARE MODERATE MDM 30: CPT | Performed by: NURSE PRACTITIONER

## 2020-09-30 NOTE — PROGRESS NOTES
Glory Deutsch  : 1950  Age 78year old  female patient is admitted to [place] [address] for rehab and strengthening      Chief complaint:Closed displaced fracture of left patella with malunion,    Type 1 diabetes ,  Hypothyroidism ,  Hypertensi but she just isn't sure.  CMS good, no swelling noted.  Patient reports her blood sugar controlled and she knows how to managed it, has specific doses for her insulin. Blood sugar 120's and reports she feels good.    Patient continues to be  off the Arimide not apply Misc Use up to 5x Q day as directed.          VITALS:  /66   Pulse 66   Temp 97.3 °F (36.3 °C) (Tympanic)   Resp 20   Wt 138 lb (62.6 kg)   SpO2 98%   BMI 20.98 kg/m²       REVIEW OF SYSTEMS:  GENERAL HEALTH:feels well otherwise  SKIN: aye will need another surgery  Due to displacement   EXTREMITIES/VASCULAR:as above   NEUROLOGIC: follows commands  PSYCHIATRIC: alert and oriented x 3; affect appropriate     SEE PLAN BELOW  1. Closed displaced fracture of left patella with malunion  - S/p rev visit and greater than 50% of the time was spent counseling the patient and/or coordinating care.     DAVEY Meza  09/30/20   3:32 PM

## 2020-10-05 ENCOUNTER — TELEPHONE (OUTPATIENT)
Dept: ENDOCRINOLOGY CLINIC | Facility: CLINIC | Age: 70
End: 2020-10-05

## 2020-10-05 NOTE — TELEPHONE ENCOUNTER
[facility]abetes store called in to advise if the CGM was received, they faxed it on Oct 2nd.  Please advise thank you

## 2020-10-06 ENCOUNTER — OFFICE VISIT (OUTPATIENT)
Dept: ORTHOPEDICS CLINIC | Facility: CLINIC | Age: 70
End: 2020-10-06
Payer: MEDICARE

## 2020-10-06 ENCOUNTER — TELEPHONE (OUTPATIENT)
Dept: INTERNAL MEDICINE CLINIC | Facility: CLINIC | Age: 70
End: 2020-10-06

## 2020-10-06 ENCOUNTER — HOSPITAL ENCOUNTER (OUTPATIENT)
Dept: GENERAL RADIOLOGY | Facility: HOSPITAL | Age: 70
Discharge: HOME | End: 2020-10-06
Attending: ORTHOPAEDIC SURGERY
Payer: MEDICARE

## 2020-10-06 ENCOUNTER — TELEPHONE (OUTPATIENT)
Dept: ORTHOPEDICS CLINIC | Facility: CLINIC | Age: 70
End: 2020-10-06

## 2020-10-06 VITALS — BODY MASS INDEX: 21.67 KG/M2 | WEIGHT: 143 LBS | HEIGHT: 68 IN

## 2020-10-06 DIAGNOSIS — M81.0 OSTEOPOROSIS, UNSPECIFIED OSTEOPOROSIS TYPE, UNSPECIFIED PATHOLOGICAL FRACTURE PRESENCE: Primary | ICD-10-CM

## 2020-10-06 DIAGNOSIS — Z47.89 ORTHOPEDIC AFTERCARE: ICD-10-CM

## 2020-10-06 DIAGNOSIS — S82.032P: Primary | ICD-10-CM

## 2020-10-06 PROCEDURE — 99024 POSTOP FOLLOW-UP VISIT: CPT | Performed by: ORTHOPAEDIC SURGERY

## 2020-10-06 PROCEDURE — 73560 X-RAY EXAM OF KNEE 1 OR 2: CPT | Performed by: ORTHOPAEDIC SURGERY

## 2020-10-06 PROCEDURE — G0463 HOSPITAL OUTPT CLINIC VISIT: HCPCS | Performed by: ORTHOPAEDIC SURGERY

## 2020-10-06 NOTE — TELEPHONE ENCOUNTER
Chat message rec'd from Dr Kelly Whelan:    Dr. Cherylene Fent, I have been treating this patient for a left patella fracture and she will require a third surgery. Surgery has been difficult given very poor bone quality.  She was undergoing initial treatme

## 2020-10-06 NOTE — H&P
NURSING INTAKE COMMENTS: Patient presents with: Follow - Up: pt in for f/u left knee, pain at a 5/10 today      HPI: This 78year old female presents today with complaints of follow-up left knee revision patella fracture ORIF 3 weeks ago.   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  INDICATIONS: FU left knee patella fracture, ORIF revision. TECHNIQUE: 2 views were obtained.    FINDINGS:  BONES: Postoperative changes of fixation of a transverse p about 7.3 mm. Marked prepatellar soft tissue swelling now noted. Correlate clinically.      Dictated by (CST): Pooja Lovell MD on 9/08/2020 at 7:12 PM     Finalized by (CST): Pooja Lovell MD on 9/08/2020 at 7:15 PM          Xr Fluoroscopy C-arm Time < XR KNEE (1 OR 2 VIEWS), LEFT (CPT=73560); Future        Assessment: Left patella malunion with loose hardware in the setting of severe osteopenia    Plan: I had a long discussion with the patient about her knee and further treatment options.   I do recomm

## 2020-10-06 NOTE — TELEPHONE ENCOUNTER
To nursing, please call pt and tell her that Dr Cliff Dukes, her ortho sent a message to me that her bones are osteoporotic. She rec'd first prolia shot in Oct 2019 by Dr Louis Wilson.    I recom she call Dr Louis Wilson today [phone] and schedule a f/u with her b/c D

## 2020-10-06 NOTE — TELEPHONE ENCOUNTER
Surgery information     Surgery:  Left Knee patella fracture revision open reduction and internal fixation versus partial patellectomy  Date:10/15/20  Location:[facility]  [place]  Clearance:no

## 2020-10-07 RX ORDER — BLOOD-GLUCOSE SENSOR
EACH MISCELLANEOUS
Qty: 3 EACH | Refills: 3 | Status: SHIPPED | OUTPATIENT
Start: 2020-10-07

## 2020-10-07 RX ORDER — BLOOD-GLUCOSE TRANSMITTER
1 EACH MISCELLANEOUS
Qty: 1 EACH | Refills: 3 | Status: SHIPPED | OUTPATIENT
Start: 2020-10-07

## 2020-10-07 NOTE — TELEPHONE ENCOUNTER
Please start ppw for prolia and schedule MD appointment as soon as covered (ok to overbook).   Also recheck Prolia labs - BMP, Bone specific alk phos, PTH.

## 2020-10-07 NOTE — TELEPHONE ENCOUNTER
Faxed Prolia IV to [employer] [facility] [employer] - will await response. RN/MA- Pls call pt once approved and book apt w/ SH (ok to overbook per below) for Prolia and OV w/ SH preferably before 10/27/20. Pt states she is unable to come for lab work.  Pt broke knee cap -currently

## 2020-10-08 ENCOUNTER — TELEPHONE (OUTPATIENT)
Dept: ORTHOPEDICS CLINIC | Facility: CLINIC | Age: 70
End: 2020-10-08

## 2020-10-08 NOTE — TELEPHONE ENCOUNTER
Erlinda Jo from [facility] called stating that pt is scheduled for surgery with Birgit Govea on 10/15/20 and needs EKG clearance. Informed Erlinda Jo that I will inform the surgery scheduler.      ANTHONY HAYS

## 2020-10-09 ENCOUNTER — TELEPHONE (OUTPATIENT)
Dept: ENDOCRINOLOGY CLINIC | Facility: CLINIC | Age: 70
End: 2020-10-09

## 2020-10-09 NOTE — TELEPHONE ENCOUNTER
Received fax from [facility] in regards to patient's lab result and placed on Dr. Cj Rm descassidy for review.

## 2020-10-09 NOTE — TELEPHONE ENCOUNTER
Labs reviewed and are stable. Will defer for Dr. Rossana Lew to review/address upon her return on 10/15.      Thank you

## 2020-10-09 NOTE — TELEPHONE ENCOUNTER
Faxed was received for Dexcom, but a qualifying diagnosis is needed. as E10.9 is not billable for Medicare for CGM. Pt. Is on the last sensor and it takes 5 business day minimum for pt to receive shipment.

## 2020-10-09 NOTE — TELEPHONE ENCOUNTER
Called [facility]:    Discussed message below. Representative will have Brittany call back to discuss. RN relayed urgency of encounter.

## 2020-10-09 NOTE — PAT NURSING NOTE
Spoke to Patrica HORTA at [facility] they'll do [place] and [place] and can be resulted on Oct 14.

## 2020-10-13 ENCOUNTER — SNF VISIT (OUTPATIENT)
Dept: INTERNAL MEDICINE CLINIC | Facility: SKILLED NURSING FACILITY | Age: 70
End: 2020-10-13

## 2020-10-13 VITALS
HEART RATE: 82 BPM | TEMPERATURE: 98 F | RESPIRATION RATE: 20 BRPM | OXYGEN SATURATION: 97 % | WEIGHT: 138 LBS | BODY MASS INDEX: 21 KG/M2 | DIASTOLIC BLOOD PRESSURE: 61 MMHG | SYSTOLIC BLOOD PRESSURE: 128 MMHG

## 2020-10-13 DIAGNOSIS — S82.032P CLOSED DISPLACED TRANSVERSE FRACTURE OF LEFT PATELLA WITH MALUNION, SUBSEQUENT ENCOUNTER: ICD-10-CM

## 2020-10-13 DIAGNOSIS — I10 HYPERTENSION, ESSENTIAL: ICD-10-CM

## 2020-10-13 DIAGNOSIS — Z01.818 PRE-OP TESTING: ICD-10-CM

## 2020-10-13 DIAGNOSIS — Z85.3 HISTORY OF BREAST CANCER: Chronic | ICD-10-CM

## 2020-10-13 PROCEDURE — 99309 SBSQ NF CARE MODERATE MDM 30: CPT | Performed by: NURSE PRACTITIONER

## 2020-10-13 NOTE — PROGRESS NOTES
Shantell Patient  : 1950  Age 78year old  female patient is admitted to [place] [address] for rehab and strengthening      Chief complaint:Closed displaced fracture of left patella with malunion,    Type 1 diabetes ,  Hypothyroidism ,  Hypertensi insulin. Blood sugar 104 to 143  and reports she feels good.   Preop testing done . Reports no changes in bowels or bladder.    No other new issues or concerns.      ALLERGIES:  No Known Allergies    CODE STATUS:  Full Code    ADVANCED CARE PLANNING TEAM: °C) (Tympanic)   Resp 20   Wt 138 lb (62.6 kg)   SpO2 97%   BMI 20.98 kg/m²       REVIEW OF SYSTEMS:    GENERAL HEALTH:feels well otherwise  SKIN: denies any unusual skin lesions or rashes  WOUNDS: leg incision with steri strips , brace on     EYES:no visu commands  PSYCHIATRIC: alert and oriented x 3; affect appropriate     SEE PLAN BELOW  1. Closed displaced fracture of left patella with malunion  - S/p revision ORIF 9/11/20.   - followed by Dr. Jin Rowe, was seen 9/28/20- was told she needs another s care.    Marly Hernandez, APRN  10/13/20   12:43 PM

## 2020-10-14 NOTE — TELEPHONE ENCOUNTER
Dr. Gordon [place] to patient to relay message below. Patient stated having surgery for knee fracture tomorrow. LOV: 4/27/20  She would like to schedule f/u with you but 1st available is 1//7/21 -she would like something sooner? ?    Please advise-than

## 2020-10-14 NOTE — TELEPHONE ENCOUNTER
Please call patient - labs are at goal.  Normal PTH level and normal calcium level. However does appear she missed a prolia injection in April due to Covid - would she like to schedule? Also she is due for MD visit. Thanks.

## 2020-10-15 ENCOUNTER — ANESTHESIA (OUTPATIENT)
Dept: SURGERY | Facility: HOSPITAL | Age: 70
End: 2020-10-15
Payer: MEDICARE

## 2020-10-15 ENCOUNTER — HOSPITAL ENCOUNTER (OUTPATIENT)
Facility: HOSPITAL | Age: 70
Setting detail: HOSPITAL OUTPATIENT SURGERY
Discharge: NURSING, REHAB OR LONG-TERM CARE | End: 2020-10-15
Attending: ORTHOPAEDIC SURGERY | Admitting: ORTHOPAEDIC SURGERY
Payer: MEDICARE

## 2020-10-15 ENCOUNTER — APPOINTMENT (OUTPATIENT)
Dept: GENERAL RADIOLOGY | Facility: HOSPITAL | Age: 70
End: 2020-10-15
Attending: ORTHOPAEDIC SURGERY
Payer: MEDICARE

## 2020-10-15 ENCOUNTER — ANESTHESIA EVENT (OUTPATIENT)
Dept: SURGERY | Facility: HOSPITAL | Age: 70
End: 2020-10-15
Payer: MEDICARE

## 2020-10-15 VITALS
OXYGEN SATURATION: 100 % | HEIGHT: 68 IN | RESPIRATION RATE: 16 BRPM | TEMPERATURE: 99 F | WEIGHT: 145 LBS | SYSTOLIC BLOOD PRESSURE: 126 MMHG | BODY MASS INDEX: 21.98 KG/M2 | DIASTOLIC BLOOD PRESSURE: 63 MMHG | HEART RATE: 75 BPM

## 2020-10-15 DIAGNOSIS — Z01.818 PRE-OP TESTING: Primary | ICD-10-CM

## 2020-10-15 PROCEDURE — 0QPF04Z REMOVAL OF INTERNAL FIXATION DEVICE FROM LEFT PATELLA, OPEN APPROACH: ICD-10-PCS | Performed by: ORTHOPAEDIC SURGERY

## 2020-10-15 PROCEDURE — 76000 FLUOROSCOPY <1 HR PHYS/QHP: CPT | Performed by: ORTHOPAEDIC SURGERY

## 2020-10-15 PROCEDURE — 20680 REMOVAL OF IMPLANT DEEP: CPT | Performed by: ORTHOPAEDIC SURGERY

## 2020-10-15 RX ORDER — FAMOTIDINE 20 MG/1
20 TABLET ORAL ONCE
Status: DISCONTINUED | OUTPATIENT
Start: 2020-10-15 | End: 2020-10-15 | Stop reason: HOSPADM

## 2020-10-15 RX ORDER — HYDROCODONE BITARTRATE AND ACETAMINOPHEN 5; 325 MG/1; MG/1
2 TABLET ORAL AS NEEDED
Status: DISCONTINUED | OUTPATIENT
Start: 2020-10-15 | End: 2020-10-15

## 2020-10-15 RX ORDER — ONDANSETRON 2 MG/ML
4 INJECTION INTRAMUSCULAR; INTRAVENOUS ONCE AS NEEDED
Status: DISCONTINUED | OUTPATIENT
Start: 2020-10-15 | End: 2020-10-15

## 2020-10-15 RX ORDER — OXYCODONE HYDROCHLORIDE 5 MG/1
10 TABLET ORAL EVERY 4 HOURS PRN
Status: CANCELLED | OUTPATIENT
Start: 2020-10-15 | End: 2020-10-16

## 2020-10-15 RX ORDER — PROCHLORPERAZINE EDISYLATE 5 MG/ML
5 INJECTION INTRAMUSCULAR; INTRAVENOUS ONCE AS NEEDED
Status: DISCONTINUED | OUTPATIENT
Start: 2020-10-15 | End: 2020-10-15

## 2020-10-15 RX ORDER — HYDROMORPHONE HYDROCHLORIDE 1 MG/ML
0.4 INJECTION, SOLUTION INTRAMUSCULAR; INTRAVENOUS; SUBCUTANEOUS EVERY 5 MIN PRN
Status: DISCONTINUED | OUTPATIENT
Start: 2020-10-15 | End: 2020-10-15

## 2020-10-15 RX ORDER — METOPROLOL TARTRATE 5 MG/5ML
2.5 INJECTION INTRAVENOUS ONCE
Status: DISCONTINUED | OUTPATIENT
Start: 2020-10-15 | End: 2020-10-15

## 2020-10-15 RX ORDER — SODIUM CHLORIDE, SODIUM LACTATE, POTASSIUM CHLORIDE, CALCIUM CHLORIDE 600; 310; 30; 20 MG/100ML; MG/100ML; MG/100ML; MG/100ML
INJECTION, SOLUTION INTRAVENOUS CONTINUOUS
Status: DISCONTINUED | OUTPATIENT
Start: 2020-10-15 | End: 2020-10-15

## 2020-10-15 RX ORDER — MIDAZOLAM HYDROCHLORIDE 1 MG/ML
INJECTION INTRAMUSCULAR; INTRAVENOUS
Status: COMPLETED | OUTPATIENT
Start: 2020-10-15 | End: 2020-10-15

## 2020-10-15 RX ORDER — NALOXONE HYDROCHLORIDE 0.4 MG/ML
80 INJECTION, SOLUTION INTRAMUSCULAR; INTRAVENOUS; SUBCUTANEOUS AS NEEDED
Status: DISCONTINUED | OUTPATIENT
Start: 2020-10-15 | End: 2020-10-15

## 2020-10-15 RX ORDER — INSULIN ASPART 100 [IU]/ML
6 INJECTION, SOLUTION INTRAVENOUS; SUBCUTANEOUS ONCE
Status: COMPLETED | OUTPATIENT
Start: 2020-10-15 | End: 2020-10-15

## 2020-10-15 RX ORDER — DEXTROSE MONOHYDRATE 25 G/50ML
50 INJECTION, SOLUTION INTRAVENOUS
Status: DISCONTINUED | OUTPATIENT
Start: 2020-10-15 | End: 2020-10-15 | Stop reason: HOSPADM

## 2020-10-15 RX ORDER — LIDOCAINE HYDROCHLORIDE 10 MG/ML
INJECTION, SOLUTION INFILTRATION; PERINEURAL
Status: COMPLETED | OUTPATIENT
Start: 2020-10-15 | End: 2020-10-15

## 2020-10-15 RX ORDER — HYDROMORPHONE HYDROCHLORIDE 1 MG/ML
0.6 INJECTION, SOLUTION INTRAMUSCULAR; INTRAVENOUS; SUBCUTANEOUS EVERY 5 MIN PRN
Status: DISCONTINUED | OUTPATIENT
Start: 2020-10-15 | End: 2020-10-15

## 2020-10-15 RX ORDER — DEXAMETHASONE SODIUM PHOSPHATE 10 MG/ML
INJECTION, SOLUTION INTRAMUSCULAR; INTRAVENOUS
Status: COMPLETED | OUTPATIENT
Start: 2020-10-15 | End: 2020-10-15

## 2020-10-15 RX ORDER — DEXTROSE MONOHYDRATE 50 MG/ML
INJECTION, SOLUTION INTRAVENOUS CONTINUOUS
Status: DISCONTINUED | OUTPATIENT
Start: 2020-10-15 | End: 2020-10-15

## 2020-10-15 RX ORDER — METOCLOPRAMIDE 10 MG/1
10 TABLET ORAL ONCE
Status: DISCONTINUED | OUTPATIENT
Start: 2020-10-15 | End: 2020-10-15 | Stop reason: HOSPADM

## 2020-10-15 RX ORDER — INSULIN ASPART 100 [IU]/ML
INJECTION, SOLUTION INTRAVENOUS; SUBCUTANEOUS ONCE
Status: COMPLETED | OUTPATIENT
Start: 2020-10-15 | End: 2020-10-15

## 2020-10-15 RX ORDER — DEXAMETHASONE SODIUM PHOSPHATE 4 MG/ML
VIAL (ML) INJECTION AS NEEDED
Status: DISCONTINUED | OUTPATIENT
Start: 2020-10-15 | End: 2020-10-15 | Stop reason: SURG

## 2020-10-15 RX ORDER — CEFAZOLIN SODIUM/WATER 2 G/20 ML
2 SYRINGE (ML) INTRAVENOUS ONCE
Status: DISCONTINUED | OUTPATIENT
Start: 2020-10-15 | End: 2020-10-15 | Stop reason: HOSPADM

## 2020-10-15 RX ORDER — MORPHINE SULFATE 10 MG/ML
6 INJECTION, SOLUTION INTRAMUSCULAR; INTRAVENOUS EVERY 10 MIN PRN
Status: DISCONTINUED | OUTPATIENT
Start: 2020-10-15 | End: 2020-10-15

## 2020-10-15 RX ORDER — HALOPERIDOL 5 MG/ML
0.25 INJECTION INTRAMUSCULAR ONCE AS NEEDED
Status: DISCONTINUED | OUTPATIENT
Start: 2020-10-15 | End: 2020-10-15

## 2020-10-15 RX ORDER — MORPHINE SULFATE 4 MG/ML
4 INJECTION, SOLUTION INTRAMUSCULAR; INTRAVENOUS EVERY 10 MIN PRN
Status: DISCONTINUED | OUTPATIENT
Start: 2020-10-15 | End: 2020-10-15

## 2020-10-15 RX ORDER — ROPIVACAINE HYDROCHLORIDE 5 MG/ML
INJECTION, SOLUTION EPIDURAL; INFILTRATION; PERINEURAL
Status: COMPLETED | OUTPATIENT
Start: 2020-10-15 | End: 2020-10-15

## 2020-10-15 RX ORDER — MORPHINE SULFATE 2 MG/ML
2 INJECTION, SOLUTION INTRAMUSCULAR; INTRAVENOUS EVERY 2 HOUR PRN
Status: CANCELLED | OUTPATIENT
Start: 2020-10-15 | End: 2020-10-16

## 2020-10-15 RX ORDER — EPHEDRINE SULFATE 50 MG/ML
INJECTION, SOLUTION INTRAVENOUS AS NEEDED
Status: DISCONTINUED | OUTPATIENT
Start: 2020-10-15 | End: 2020-10-15 | Stop reason: SURG

## 2020-10-15 RX ORDER — ONDANSETRON 2 MG/ML
INJECTION INTRAMUSCULAR; INTRAVENOUS AS NEEDED
Status: DISCONTINUED | OUTPATIENT
Start: 2020-10-15 | End: 2020-10-15 | Stop reason: SURG

## 2020-10-15 RX ORDER — LIDOCAINE HYDROCHLORIDE 10 MG/ML
INJECTION, SOLUTION EPIDURAL; INFILTRATION; INTRACAUDAL; PERINEURAL AS NEEDED
Status: DISCONTINUED | OUTPATIENT
Start: 2020-10-15 | End: 2020-10-15 | Stop reason: SURG

## 2020-10-15 RX ORDER — HYDROMORPHONE HYDROCHLORIDE 1 MG/ML
0.2 INJECTION, SOLUTION INTRAMUSCULAR; INTRAVENOUS; SUBCUTANEOUS EVERY 5 MIN PRN
Status: DISCONTINUED | OUTPATIENT
Start: 2020-10-15 | End: 2020-10-15

## 2020-10-15 RX ORDER — ACETAMINOPHEN 500 MG
1000 TABLET ORAL ONCE
Status: COMPLETED | OUTPATIENT
Start: 2020-10-15 | End: 2020-10-15

## 2020-10-15 RX ORDER — MORPHINE SULFATE 4 MG/ML
6 INJECTION, SOLUTION INTRAMUSCULAR; INTRAVENOUS EVERY 2 HOUR PRN
Status: CANCELLED | OUTPATIENT
Start: 2020-10-15 | End: 2020-10-16

## 2020-10-15 RX ORDER — ACETAMINOPHEN 500 MG
1000 TABLET ORAL EVERY 8 HOURS
Status: CANCELLED | OUTPATIENT
Start: 2020-10-15 | End: 2020-10-16

## 2020-10-15 RX ORDER — MORPHINE SULFATE 4 MG/ML
4 INJECTION, SOLUTION INTRAMUSCULAR; INTRAVENOUS EVERY 2 HOUR PRN
Status: CANCELLED | OUTPATIENT
Start: 2020-10-15 | End: 2020-10-16

## 2020-10-15 RX ORDER — OXYCODONE HYDROCHLORIDE 5 MG/1
5 TABLET ORAL EVERY 4 HOURS PRN
Status: CANCELLED | OUTPATIENT
Start: 2020-10-15 | End: 2020-10-16

## 2020-10-15 RX ORDER — MORPHINE SULFATE 15 MG/1
15 TABLET ORAL EVERY 4 HOURS PRN
Status: CANCELLED | OUTPATIENT
Start: 2020-10-15 | End: 2020-10-16

## 2020-10-15 RX ORDER — DEXTROSE MONOHYDRATE 25 G/50ML
50 INJECTION, SOLUTION INTRAVENOUS
Status: DISCONTINUED | OUTPATIENT
Start: 2020-10-15 | End: 2020-10-15

## 2020-10-15 RX ORDER — CEFAZOLIN SODIUM/WATER 2 G/20 ML
SYRINGE (ML) INTRAVENOUS AS NEEDED
Status: DISCONTINUED | OUTPATIENT
Start: 2020-10-15 | End: 2020-10-15 | Stop reason: SURG

## 2020-10-15 RX ORDER — MORPHINE SULFATE 4 MG/ML
2 INJECTION, SOLUTION INTRAMUSCULAR; INTRAVENOUS EVERY 10 MIN PRN
Status: DISCONTINUED | OUTPATIENT
Start: 2020-10-15 | End: 2020-10-15

## 2020-10-15 RX ORDER — HYDROCODONE BITARTRATE AND ACETAMINOPHEN 5; 325 MG/1; MG/1
1 TABLET ORAL AS NEEDED
Status: DISCONTINUED | OUTPATIENT
Start: 2020-10-15 | End: 2020-10-15

## 2020-10-15 RX ADMIN — CEFAZOLIN SODIUM/WATER 2 G: 2 G/20 ML SYRINGE (ML) INTRAVENOUS at 09:58:00

## 2020-10-15 RX ADMIN — SODIUM CHLORIDE, SODIUM LACTATE, POTASSIUM CHLORIDE, CALCIUM CHLORIDE: 600; 310; 30; 20 INJECTION, SOLUTION INTRAVENOUS at 09:42:00

## 2020-10-15 RX ADMIN — LIDOCAINE HYDROCHLORIDE 50 MG: 10 INJECTION, SOLUTION EPIDURAL; INFILTRATION; INTRACAUDAL; PERINEURAL at 09:48:00

## 2020-10-15 RX ADMIN — DEXAMETHASONE SODIUM PHOSPHATE 4 MG: 4 MG/ML VIAL (ML) INJECTION at 09:57:00

## 2020-10-15 RX ADMIN — DEXAMETHASONE SODIUM PHOSPHATE 10 MG: 10 INJECTION, SOLUTION INTRAMUSCULAR; INTRAVENOUS at 08:55:00

## 2020-10-15 RX ADMIN — ROPIVACAINE HYDROCHLORIDE 30 ML: 5 INJECTION, SOLUTION EPIDURAL; INFILTRATION; PERINEURAL at 08:55:00

## 2020-10-15 RX ADMIN — LIDOCAINE HYDROCHLORIDE 5 ML: 10 INJECTION, SOLUTION INFILTRATION; PERINEURAL at 08:55:00

## 2020-10-15 RX ADMIN — ONDANSETRON 4 MG: 2 INJECTION INTRAMUSCULAR; INTRAVENOUS at 09:57:00

## 2020-10-15 RX ADMIN — EPHEDRINE SULFATE 10 MG: 50 INJECTION, SOLUTION INTRAVENOUS at 10:07:00

## 2020-10-15 RX ADMIN — SODIUM CHLORIDE, SODIUM LACTATE, POTASSIUM CHLORIDE, CALCIUM CHLORIDE: 600; 310; 30; 20 INJECTION, SOLUTION INTRAVENOUS at 10:20:00

## 2020-10-15 RX ADMIN — MIDAZOLAM HYDROCHLORIDE 2 MG: 1 INJECTION INTRAMUSCULAR; INTRAVENOUS at 08:55:00

## 2020-10-15 NOTE — ANESTHESIA PROCEDURE NOTES
Airway  Urgency: Elective    Airway not difficult    General Information and Staff    Patient location during procedure: OR  Anesthesiologist: Ethel Saldaña MD  Resident/CRNA: Allison Alford CRNA  Performed: anesthesiologist and CRNA     Lora Medeiros

## 2020-10-15 NOTE — ANESTHESIA PREPROCEDURE EVALUATION
Anesthesia PreOp Note    HPI:     Nuvia Waddell is a 78year old female who presents for preoperative consultation requested by: Sidra [place] MD    Date of Surgery: 10/15/2020    Procedure(s):  PATELLA OPEN REDUCTION INTERNAL FIXATION  Indication: shots twice a month per Dr Tabitah Dolan at [address] history of antineoplastic chemotherapy    • Type 1 diabetes mellitus ([facility] [address]     since age 17   • Visual impairment     readers       Past Surgical History:   Procedure Laterality Date   • ANKLE FRACTURE (Patient taking differently: Take 100 mcg by mouth before breakfast.  ), Disp: 90 tablet, Rfl: 3, 10/15/2020 at 0545    •  METOPROLOL SUCCINATE ER 25 MG Oral Tablet 24 Hr, TAKE 1 TABLET DAILY (Patient taking differently: Take 25 mg by mouth daily.  ), Disp Elicia Mcdaniels MD    Or    •  dextrose 50 % injection 50 mL, 50 mL, Intravenous, Q15 Min PRN, Devyn Mcclure MD    Or    •  glucose (DEX4) oral liquid 30 g, 30 g, Oral, Q15 Min PRN, Devyn Mcclure MD    Or    •  Glucose-Vitamin C (DEX-4) chewable tab 8 tablet, 8 Active member of club or organization: Not on file        Attends meetings of clubs or organizations: Not on file        Relationship status: Not on file      Intimate partner violence        Fear of current or ex partner: Not on file        Emotionall 3  Plan:   General  Airway:  LMA  Post-op Pain Management: Femoral block  Informed Consent Plan and Risks Discussed With:  Patient  Use of Blood Products Discussed With:  Patient      I have informed Patricio Romero and/or legal guardian or family member of

## 2020-10-15 NOTE — ANESTHESIA PROCEDURE NOTES
Peripheral Block    Date/Time: 10/15/2020 8:55 AM  Performed by: Wally Greer MD  Authorized by: Wally Greer MD       General Information and Staff    Start Time:  10/15/2020 8:53 AM  End Time:  10/15/2020 9:00 AM  Anesthesiologist:  Wally Greer, Medications  10/15/2020 8:55 AM  Midazolam (VERSED)injection 2mg/2ml, 2 mg  lidocaine injection 1%, 5 mL  ropivacaine (NAROPIN) injection 0.5%, 30 mL  dexamethasone (DECADRON) PF injection 10 mg/ml, 10 mg    Additional Comments    Good visualization ne

## 2020-10-15 NOTE — ANESTHESIA POSTPROCEDURE EVALUATION
Patient: Abdelrahman Herrera    Procedure Summary     Date: 10/15/20 Room / Location: [address] [place] [address] MAIN OR 16 / [address] [place] [address] MAIN OR    Anesthesia Start: 3707 Anesthesia Stop:     Procedure: PATELLA OPEN REDUCTION INTERNAL FIXATION (Left Knee) Diagnosis: (left knee patella fractu

## 2020-10-15 NOTE — OPERATIVE REPORT
[facility] POST ANESTHESIA CARE UNIT  Operative Note     Wolf Mistryedison Location: OR   [facility] [id number] MRN [id number]   Admission Date 10/15/2020 Operation Date 10/15/2020   Attending Physician Floresita Lopez MD Operating Physician Selam LOUIS a timeout was performed. Patient identified the correct patient, left knee was identified as the correct procedure site and this was verified with her consent. The entire operative team agreed to proceed.   Patient was given IV antibiotics prior to making

## 2020-10-15 NOTE — INTERVAL H&P NOTE
Pre-op Diagnosis: left knee patella fracture malunion    The above referenced H&P was reviewed by Ailyn Hooper MD on 10/15/2020, the patient was examined and no significant changes have occurred in the patient's condition since the H&P was performed

## 2020-10-16 ENCOUNTER — TELEPHONE (OUTPATIENT)
Dept: ORTHOPEDICS CLINIC | Facility: CLINIC | Age: 70
End: 2020-10-16

## 2020-10-16 NOTE — TELEPHONE ENCOUNTER
Per Tula Krabbe calling to clarify weight bearing status, is it for transfers only. Pt states she was given different info and is insistent on using walker. Per Tula Krabbe when calling please ask to page Tula Krabbe with therapy.

## 2020-10-16 NOTE — TELEPHONE ENCOUNTER
Upon review, clinic has sent Medicare Written Detailed Orders in the past, and used E10.9 as the diagnosis code.      Recent scanned documents from 10/1/19, 1/3/19, and 10/12/20 are forms we have submitted in the past.     Called [facility]:    Shana [facility] is

## 2020-10-16 NOTE — TELEPHONE ENCOUNTER
Called Arabella and Violeta Arechiga is gone for the day. Left message on machine at therapy department with Dr. Armando Chery orders. Advised to call back if any questions.

## 2020-10-16 NOTE — TELEPHONE ENCOUNTER
Spoke to patient and relayed Dr. Dave Tapia message as shown below. Patient verbalized understanding of whole message and had no further questions at this time. Appt offered and scheduled with Jaci MALDONADO.

## 2020-10-16 NOTE — TELEPHONE ENCOUNTER
Patient can be protected weightbearing with knee brace locked in extension using a walker. When she is nonweightbearing she can unlock the brace from 0 to 30 degrees where it is currently set.

## 2020-10-19 ENCOUNTER — SNF VISIT (OUTPATIENT)
Dept: INTERNAL MEDICINE CLINIC | Facility: SKILLED NURSING FACILITY | Age: 70
End: 2020-10-19

## 2020-10-19 VITALS
WEIGHT: 138 LBS | TEMPERATURE: 97 F | DIASTOLIC BLOOD PRESSURE: 67 MMHG | SYSTOLIC BLOOD PRESSURE: 120 MMHG | RESPIRATION RATE: 20 BRPM | HEART RATE: 68 BPM | BODY MASS INDEX: 21 KG/M2 | OXYGEN SATURATION: 98 %

## 2020-10-19 DIAGNOSIS — S82.042K CLOSED DISPLACED COMMINUTED FRACTURE OF LEFT PATELLA WITH NONUNION, SUBSEQUENT ENCOUNTER: ICD-10-CM

## 2020-10-19 DIAGNOSIS — E10.9 TYPE 1 DIABETES MELLITUS WITHOUT COMPLICATION (HCC): ICD-10-CM

## 2020-10-19 DIAGNOSIS — M81.0 AGE-RELATED OSTEOPOROSIS WITHOUT CURRENT PATHOLOGICAL FRACTURE: ICD-10-CM

## 2020-10-19 DIAGNOSIS — I10 HYPERTENSION, ESSENTIAL: ICD-10-CM

## 2020-10-19 DIAGNOSIS — Z85.3 HISTORY OF BREAST CANCER: Chronic | ICD-10-CM

## 2020-10-19 DIAGNOSIS — S82.032P CLOSED DISPLACED TRANSVERSE FRACTURE OF LEFT PATELLA WITH MALUNION, SUBSEQUENT ENCOUNTER: ICD-10-CM

## 2020-10-19 PROCEDURE — 99310 SBSQ NF CARE HIGH MDM 45: CPT | Performed by: NURSE PRACTITIONER

## 2020-10-19 NOTE — TELEPHONE ENCOUNTER
Attempted to contact [facility]:    Left detailed message.      Called [facility] again:    Explained that patients Dexcom supplies are no longer covered on the pharmacy side, so they have to bill through the medical side in order to get her supplie

## 2020-10-19 NOTE — PROGRESS NOTES
Hussain Cosme  : 1950  Age 78year old  female patient is admitted to [place] [address] for rehab and strengthening      Chief complaint:Closed displaced fracture of left patella with malunion,    Type 1 diabetes ,  Hypothyroidism ,  Hypertensi therapy, as much as she can do.  She is a little frustrated with  the repeated surgeries but wants to get better.   CMS good, no swelling noted. Patient reports her blood sugar controlled and she knows how to managed it, has specific doses for her insulin.  units,(151-200) - 9 units, (201-250) 10 units,(greater than 250) - 11 units  Four times daily 8am,1130am,5 pm,9 pm  15 mL 3   • cyanocobalamin 1000 MCG/ML Injection Solution Inject 1 mL (1,000 mcg total) into the skin every 30 (thirty) days.  3 vial 3   • L DRAINS:  none  SKIN: no rashes, no suspicious lesions  WOUND: dressing over incision, logan hose on and brace in place   EYES: PERRLA, EOMI, sclera anicteric, conjunctiva normal; there is no nystagmus, no drainage from eyes  HENT: normocephalic; normal nose, 5  units with meals  -increased  Levemir 14 units at night  -cont Humalog 3 units tid ( with meals) her normal insulin doses , demanded per patient , will watch closely   -accucheck qid , ranging 233 to 269     - Dietician following  in rehab and follows ,

## 2020-10-20 ENCOUNTER — TELEPHONE (OUTPATIENT)
Dept: ORTHOPEDICS CLINIC | Facility: CLINIC | Age: 70
End: 2020-10-20

## 2020-10-20 NOTE — TELEPHONE ENCOUNTER
Per TE 10/16/20-  Silvino Mejia MD  to Me • [facility] Clinical Staff        10/16/20 2:57 PM  Note     Patient can be protected weightbearing with knee brace locked in extension using a walker.   When she is nonweightbearing she can unlock the brace f

## 2020-10-23 ENCOUNTER — TELEPHONE (OUTPATIENT)
Dept: ENDOCRINOLOGY CLINIC | Facility: CLINIC | Age: 70
End: 2020-10-23

## 2020-10-23 NOTE — TELEPHONE ENCOUNTER
Received SOB for prolia. PA needed. Pt is to owe 20% of cost. Completed [employer] PA form and faxed. Waiting response.

## 2020-10-26 ENCOUNTER — OFFICE VISIT (OUTPATIENT)
Dept: ENDOCRINOLOGY CLINIC | Facility: CLINIC | Age: 70
End: 2020-10-26
Payer: MEDICARE

## 2020-10-26 ENCOUNTER — HOSPITAL ENCOUNTER (OUTPATIENT)
Dept: GENERAL RADIOLOGY | Facility: HOSPITAL | Age: 70
Discharge: HOME | End: 2020-10-26
Attending: ORTHOPAEDIC SURGERY
Payer: MEDICARE

## 2020-10-26 ENCOUNTER — OFFICE VISIT (OUTPATIENT)
Dept: ORTHOPEDICS CLINIC | Facility: CLINIC | Age: 70
End: 2020-10-26
Payer: MEDICARE

## 2020-10-26 VITALS — DIASTOLIC BLOOD PRESSURE: 81 MMHG | HEART RATE: 86 BPM | SYSTOLIC BLOOD PRESSURE: 145 MMHG

## 2020-10-26 VITALS — WEIGHT: 145 LBS | BODY MASS INDEX: 22 KG/M2

## 2020-10-26 DIAGNOSIS — Z47.89 ORTHOPEDIC AFTERCARE: ICD-10-CM

## 2020-10-26 DIAGNOSIS — M80.80XG OTHER OSTEOPOROSIS WITH CURRENT PATHOLOGICAL FRACTURE WITH DELAYED HEALING, SUBSEQUENT ENCOUNTER: ICD-10-CM

## 2020-10-26 DIAGNOSIS — Z47.89 ORTHOPEDIC AFTERCARE: Primary | ICD-10-CM

## 2020-10-26 DIAGNOSIS — E10.65 TYPE 1 DIABETES MELLITUS WITH HYPERGLYCEMIA (HCC): Primary | ICD-10-CM

## 2020-10-26 DIAGNOSIS — E03.9 HYPOTHYROIDISM (ACQUIRED): ICD-10-CM

## 2020-10-26 PROCEDURE — 73560 X-RAY EXAM OF KNEE 1 OR 2: CPT | Performed by: ORTHOPAEDIC SURGERY

## 2020-10-26 PROCEDURE — 99213 OFFICE O/P EST LOW 20 MIN: CPT | Performed by: NURSE PRACTITIONER

## 2020-10-26 PROCEDURE — 96372 THER/PROPH/DIAG INJ SC/IM: CPT | Performed by: NURSE PRACTITIONER

## 2020-10-26 PROCEDURE — G0463 HOSPITAL OUTPT CLINIC VISIT: HCPCS | Performed by: ORTHOPAEDIC SURGERY

## 2020-10-26 PROCEDURE — 99024 POSTOP FOLLOW-UP VISIT: CPT | Performed by: ORTHOPAEDIC SURGERY

## 2020-10-26 PROCEDURE — 36416 COLLJ CAPILLARY BLOOD SPEC: CPT | Performed by: NURSE PRACTITIONER

## 2020-10-26 PROCEDURE — 83036 HEMOGLOBIN GLYCOSYLATED A1C: CPT | Performed by: NURSE PRACTITIONER

## 2020-10-26 PROCEDURE — 82947 ASSAY GLUCOSE BLOOD QUANT: CPT | Performed by: NURSE PRACTITIONER

## 2020-10-26 NOTE — PROGRESS NOTES
NURSING INTAKE COMMENTS: Patient presents with:  Post-Op: Denies concerns.        HPI: This 78year old female presents today with complaints of follow-up multiple left knee surgeries including patella fracture ORIF on 8/16/2020, revision ORIF on 9/11/2020 11/2018    plate placed for fx proximal tib-fib fx--Dr Tyrone Huber. in [address] LEFT  03/2017    in [place] for bilateral breast cancer   • MASTECTOMY RIGHT  03/2017    in [place] for bilateral breast cancer   • OTHER      left krishnan differently: Take 25 mg by mouth daily.  ) 90 tablet 3   • Insulin Pen Needle (BD PEN NEEDLE SHORT U/F) 31G X 8 MM Does not apply Misc USE UP TO 5 TIMES EVERY  each 3   • ERGOCALCIFEROL 1.25 MG (62662 UT) Oral Cap TAKE 1 CAPSULE ONCE A WEEK 12 capsu degrees. Patient can do a straight leg raise without extensor lag.     Imaging: Xr Knee (1 Or 2 Views), Left (cpt=73560)    Result Date: 10/6/2020  PROCEDURE: XR KNEE (1 OR 2 VIEWS), LEFT (CPT=73560)  COMPARISON: [facility] for [address] [place] CONCLUSION:   Postoperative changes of patellar fixation with adjacent soft tissue swelling. Chronic patellar tendinosis.      Dictated by (CST): Demarco Romo MD on 9/28/2020 at 11:27 AM     Finalized by (CST): Demarco Romo MD on 9/28/2020 at 11:28 AM unlock her brace from 0 to 50 degrees. She will follow-up in 2 weeks for repeat evaluation and most likely allow her to start full range of motion. The above note was creating using Dragon speech recognition technology. Please excuse any typos.     Damion Akhtar

## 2020-10-26 NOTE — PROGRESS NOTES
Name: Jaylen Sanz  Date: 10/26/2020    Referring Physician: No ref. provider found    CHIEF COMPLAINT   No chief complaint on file. HISTORY OF PRESENT ILLNESS   Jaylen Sanz is a 78year old female who presents for follow up on diabetes management.  I In target range: (70-180mg/dl): 48%     High glucose targets: (> 180mg/dl): 21%     Very high glucose targets: (> 250mg/dl): 27%     Low glucose targets: (less than 70mg/dl): 3%     Very Low glucose targets: (< 54mg/dl ):1%     -Hyperglycemia noted ear Negative for: bruising, easy bleeding, lower extremity edema  Endocrine: Negative for: polyuria, polydipsia. Thyroid disease: Yes, taking LT4 100mcg PO daily 6 days per week. She is currently feeling well on medication. Normal energy level.      Anna Nails pm,9 pm , Disp: 15 mL, Rfl: 3  •  cyanocobalamin 1000 MCG/ML Injection Solution, Inject 1 mL (1,000 mcg total) into the skin every 30 (thirty) days. , Disp: 3 vial, Rfl: 3  •  Levothyroxine Sodium 100 MCG Oral Tab, TAKE 1 TABLET 6 DAYS A WEEK (Patient rubenin Gallstones 2019    US liver 10/25/19 hepatic steatosis, gallstones, 6 mm hemangioma vs dystrophic calcification. No suspicious hepatic lesions. .   • High blood pressure    • High cholesterol     diet controlled   • History of blood transfusion     during tone  Skin:  normal moisture and skin texture  Hair & Nails:  normal scalp hair     Psychiatric:  oriented to time, self, and place  Nutritional:  no abnormal weight gain or loss    LABS: Pertinent labs reviewed    ASSESSMENT/PLAN:    -Reviewed with patiprince continue to monitor BG readings via Dexcom G6 CGM  f) Life style changes: Diet: low carbohydrate diet 30-45gm per meal discussed, Exercise: should target exercise to at least 150min a week.   g) Hypoglycemia: Reviewed hypoglycemia signs/symptoms, treatment

## 2020-10-26 NOTE — PATIENT INSTRUCTIONS
Your A1C: 6.4% today --> increase from 4.7% on 7/1/2020  The main goal of diabetes treatment is to keep your sugar from going too high.  We measure your overall blood sugar trends with a Hemoglobin A1C test. (also called an A1C)  For most people the target low blood glucose anyway. 2. Take 15 grams of carbohydrate (carb). Here are some choices:  4 oz. regular fruit juice  3-4 glucose tablets  6 oz. regular soda   7-8 jelly beans  3. Recheck blood glucose after 10-15 minutes.  If blood glucose is still low (l

## 2020-10-29 ENCOUNTER — SNF DISCHARGE (OUTPATIENT)
Dept: INTERNAL MEDICINE CLINIC | Facility: SKILLED NURSING FACILITY | Age: 70
End: 2020-10-29

## 2020-10-29 VITALS
SYSTOLIC BLOOD PRESSURE: 132 MMHG | HEART RATE: 68 BPM | RESPIRATION RATE: 20 BRPM | WEIGHT: 138 LBS | OXYGEN SATURATION: 97 % | BODY MASS INDEX: 21 KG/M2 | DIASTOLIC BLOOD PRESSURE: 64 MMHG | TEMPERATURE: 97 F

## 2020-10-29 DIAGNOSIS — S82.032P CLOSED DISPLACED TRANSVERSE FRACTURE OF LEFT PATELLA WITH MALUNION, SUBSEQUENT ENCOUNTER: ICD-10-CM

## 2020-10-29 DIAGNOSIS — Z02.9 PROBLEM RELATED TO DISCHARGE PLANNING: ICD-10-CM

## 2020-10-29 DIAGNOSIS — Z85.3 HISTORY OF BREAST CANCER: Chronic | ICD-10-CM

## 2020-10-29 DIAGNOSIS — E10.9 TYPE 1 DIABETES MELLITUS WITHOUT COMPLICATION (HCC): ICD-10-CM

## 2020-10-29 DIAGNOSIS — I10 HYPERTENSION, ESSENTIAL: ICD-10-CM

## 2020-10-29 PROCEDURE — 99310 SBSQ NF CARE HIGH MDM 45: CPT | Performed by: NURSE PRACTITIONER

## 2020-10-29 NOTE — PROGRESS NOTES
Hussain Cosme, 8/25/1950, 78year old, female is being discharged from [place] [address]    10/29/20 [facility] alone with [facility]     DISCHARGE SUMMARY    Date of Discharge [facility] :8/24/20 to 10/29/20 , had three surgeries bowels or bladder.    No other new issues or concerns.   Family considering caregivers if needed but she reports she will see how it goes. Reviewed meds, no scripts needed . Patient to see ortho in 2 weeks, will also f/u with her PCP and endocrine .  Hannah nontender, no guarding, no rebound tenderness.   :no suprapubic distension  LYMPHATIC: left leg in cast    MUSCULOSKELETAL: s/p another  surgery for repair , nwb, will need another surgery  Due to displacement   EXTREMITIES/VASCULAR:as above   NEUROLOGIC: last day she took it was 9/7/20  -patient now off the Arimidex since going back to hospital per her request        - patient  reports is looking around for a new oncologist possibly and therapies to improve bone loss   7.  Discharge 10/29/20  -home alone  -

## 2020-10-30 ENCOUNTER — PATIENT OUTREACH (OUTPATIENT)
Dept: CASE MANAGEMENT | Age: 70
End: 2020-10-30

## 2020-10-30 DIAGNOSIS — Z02.9 ENCOUNTERS FOR ADMINISTRATIVE PURPOSE: ICD-10-CM

## 2020-10-30 DIAGNOSIS — S82.032P CLOSED DISPLACED TRANSVERSE FRACTURE OF LEFT PATELLA WITH MALUNION, SUBSEQUENT ENCOUNTER: ICD-10-CM

## 2020-10-30 PROCEDURE — 1111F DSCHRG MED/CURRENT MED MERGE: CPT

## 2020-10-30 NOTE — PROGRESS NOTES
Initial Post Discharge Follow Up   Discharge Date from SNF: 10/29/20  Contact Date: 10/30/2020    Consent Verification:  Assessment Completed With: Patient  HIPAA Verified?   Yes    Discharge Dx:   S/p [facility] for Closed displaced transverse fracture of left Transmit (DEXCOM G6 TRANSMITTER) Does not apply Misc 1 each by Does not apply route every 3 (three) months. 1 each 3   • insulin glargine (LANTUS SOLOSTAR) 100 UNIT/ML Subcutaneous Solution Pen-injector Inject 14 Units into the skin nightly.   0   • docusat Other: Pt states that she will be getting PT but can not recall the name. DME ordered at D/C? No        Needs post D/C:   Now that you are home, are there any needs or concerns you need addressed before your next visit with your PCP?  (DME, meds, disease ([facility])        Feb 01, 2021  2:15 PM CST Follow Up Visit with Tristen Menchaca, [facility] Endocr[facility]ology ([facility])        Apr 27, 2021 10:00 AM CDT Follow Up Visit with Dae Ace, Sachin Pedersen patient,  and orders reviewed and discussed. Any changes or updates to medications and or orders sent to PCP.

## 2020-11-03 ENCOUNTER — TELEPHONE (OUTPATIENT)
Dept: ENDOCRINOLOGY CLINIC | Facility: CLINIC | Age: 70
End: 2020-11-03

## 2020-11-04 ENCOUNTER — TELEPHONE (OUTPATIENT)
Dept: INTERNAL MEDICINE CLINIC | Facility: CLINIC | Age: 70
End: 2020-11-04

## 2020-11-04 NOTE — TELEPHONE ENCOUNTER
rn called patient with instructions from [facility]. Tim verbalized understanding of instructions . Will call in 1 wk with new code.

## 2020-11-04 NOTE — TELEPHONE ENCOUNTER
Please call Kaitlin/[facility] at [phone]  Pt at [facility]  The rec'd a [address] order for  nursing/PT/ OT/health aid  Pt refusing nursing  Yinka Gross for PT/OT/Health aid?   Please call back today if possible   Tasked to nursing

## 2020-11-04 NOTE — TELEPHONE ENCOUNTER
To nursing, tell them yes, okay for PT/OT/home health aide. Thanks. She has an appointment to see me 11/6/20.

## 2020-11-04 NOTE — TELEPHONE ENCOUNTER
Guevara from [facility] and relayed DR. DÍAZ message - verbalized understanding

## 2020-11-06 ENCOUNTER — OFFICE VISIT (OUTPATIENT)
Dept: INTERNAL MEDICINE CLINIC | Facility: CLINIC | Age: 70
End: 2020-11-06
Payer: MEDICARE

## 2020-11-06 ENCOUNTER — TELEPHONE (OUTPATIENT)
Dept: INTERNAL MEDICINE CLINIC | Facility: CLINIC | Age: 70
End: 2020-11-06

## 2020-11-06 VITALS
TEMPERATURE: 98 F | HEART RATE: 92 BPM | DIASTOLIC BLOOD PRESSURE: 64 MMHG | OXYGEN SATURATION: 99 % | BODY MASS INDEX: 21 KG/M2 | HEIGHT: 68 IN | SYSTOLIC BLOOD PRESSURE: 118 MMHG

## 2020-11-06 DIAGNOSIS — I10 HYPERTENSION, ESSENTIAL: ICD-10-CM

## 2020-11-06 DIAGNOSIS — S82.032P CLOSED DISPLACED TRANSVERSE FRACTURE OF LEFT PATELLA WITH MALUNION, SUBSEQUENT ENCOUNTER: Primary | ICD-10-CM

## 2020-11-06 DIAGNOSIS — M81.0 AGE-RELATED OSTEOPOROSIS WITHOUT CURRENT PATHOLOGICAL FRACTURE: ICD-10-CM

## 2020-11-06 DIAGNOSIS — E10.9 TYPE 1 DIABETES MELLITUS WITHOUT COMPLICATION (HCC): ICD-10-CM

## 2020-11-06 PROCEDURE — 99495 TRANSJ CARE MGMT MOD F2F 14D: CPT | Performed by: INTERNAL MEDICINE

## 2020-11-06 PROCEDURE — 1111F DSCHRG MED/CURRENT MED MERGE: CPT | Performed by: INTERNAL MEDICINE

## 2020-11-06 PROCEDURE — G0008 ADMIN INFLUENZA VIRUS VAC: HCPCS | Performed by: INTERNAL MEDICINE

## 2020-11-06 PROCEDURE — 90662 IIV NO PRSV INCREASED AG IM: CPT | Performed by: INTERNAL MEDICINE

## 2020-11-06 RX ORDER — CYANOCOBALAMIN 1000 UG/ML
1000 INJECTION INTRAMUSCULAR; SUBCUTANEOUS
Qty: 3 VIAL | Refills: 3 | Status: SHIPPED | OUTPATIENT
Start: 2020-11-06

## 2020-11-06 RX ORDER — INSULIN GLARGINE 100 [IU]/ML
10 INJECTION, SOLUTION SUBCUTANEOUS NIGHTLY
Refills: 0 | COMMUNITY
Start: 2020-11-06 | End: 2020-12-21

## 2020-11-06 NOTE — PROGRESS NOTES
Loly Worley is a 78year old female who presents for       Last seen here on 7/6/20. TCM visit  Yenni Peralta and fractured L patella 8/16/20. Was hosp [facility] 8/20 to 8/24 for ORIF Dr Eva Dobson. Then went to [facility].   Was hosp 9/11 to 9/14 for revision of n transfusion     during time getting chemo, hospital in [place]- no reaction   • Osteoarthritis    • Osteoporosis 2005   • Pernicious anemia 1980s    self administers B12 shots twice a month per Dr Tabitha Dolan at [address] history of antineoplastic chem Allergies    Review of systems:  Constitutional:  No fever, loss of appetite or unintentional weight loss  Respiratory: No cough, wheezing or dyspnea  Cardiac: No chest pain or palpitations  Gastrointestinal: No abdominal pain, vomiting, diarrhea or consti lipitor)--controlled off med. Elevated LFTs-fatty liver as possible etiology--gallstones asymptomatic. Lab 7/1/20–ALT 77 --compares to alt 65 ast 95 Alk ph156 on 7/12/19. Elev LFTs in [place] notes elev LFTs for ~30 yrs. Ret in 4 mo. Patient expresses understanding of above issues and plan. This is a 25 minute visit and greater than 50% of the time was spent counseling the patient and/or coordinating care.     Her son, a nurse practicioner,lives in [place]. She was discharged from [facility], [facility] to Home   Admission Date: 8/24/20 was adm to Wei from [facility]  Discharge Date: 10/29/20 from [facility]. Hospital Discharge Diagnoses--L patella fx.      Interactive contact within 2 business days post discharge first initiate

## 2020-11-06 NOTE — TELEPHONE ENCOUNTER
Patient start of care was to be today for PT  Patient requesting start of care for Tuesday, 11/10/20 due to multiple appointments  Tasked to nursing

## 2020-11-06 NOTE — TELEPHONE ENCOUNTER
Called Shailesh with [facility] and relayed DR. DÍAZ message - left on confidential VM

## 2020-11-09 ENCOUNTER — OFFICE VISIT (OUTPATIENT)
Dept: ORTHOPEDICS CLINIC | Facility: CLINIC | Age: 70
End: 2020-11-09
Payer: MEDICARE

## 2020-11-09 ENCOUNTER — HOSPITAL ENCOUNTER (OUTPATIENT)
Dept: GENERAL RADIOLOGY | Facility: HOSPITAL | Age: 70
Discharge: HOME | End: 2020-11-09
Attending: ORTHOPAEDIC SURGERY
Payer: MEDICARE

## 2020-11-09 VITALS — HEIGHT: 68 IN | BODY MASS INDEX: 21.67 KG/M2 | WEIGHT: 143 LBS

## 2020-11-09 DIAGNOSIS — Z47.89 ORTHOPEDIC AFTERCARE: Primary | ICD-10-CM

## 2020-11-09 DIAGNOSIS — Z47.89 ORTHOPEDIC AFTERCARE: ICD-10-CM

## 2020-11-09 PROCEDURE — 99024 POSTOP FOLLOW-UP VISIT: CPT | Performed by: ORTHOPAEDIC SURGERY

## 2020-11-09 PROCEDURE — 73560 X-RAY EXAM OF KNEE 1 OR 2: CPT | Performed by: ORTHOPAEDIC SURGERY

## 2020-11-09 PROCEDURE — G0463 HOSPITAL OUTPT CLINIC VISIT: HCPCS | Performed by: ORTHOPAEDIC SURGERY

## 2020-11-09 NOTE — PROGRESS NOTES
NURSING INTAKE COMMENTS: Patient presents with:   Follow - Up: left knee FX ,patient states her pain is a 2/10       HPI: This 78year old female presents today with complaints of routine postop visit left knee patella fracture status post revision ORIF on Tracey Khan in [place]   • CATARACT EXTRACTION Bilateral 2016    Dr Lauren Yee Right 11/2018    plate placed for fx proximal tib-fib fx--Dr Moni Simpson. in [facility] LEFT  03/2017    in [place] for [address] 1 tablet by mouth every 4 (four) hours as needed. (Patient not taking: Reported on 11/9/2020 ) 10 tablet 0   • Continuous Blood Gluc Transmit (DEXCOM G6 TRANSMITTER) Does not apply Misc 1 each by Does not apply route every 3 (three) months.  1 each 3   • In no tenderness to palpation over the patella. Patient can do a straight leg raise with good quad control and no extensor lag.     Imaging: Xr Knee (1 Or 2 Views), Left (cpt=73560)    Result Date: 10/26/2020  PROCEDURE: XR KNEE (1 OR 2 VIEWS), LEFT (CPT=7356 C-ARM  TIME< 1 HOUR (CPT=76000), 9/11/2020, 2:35 PM.  [facility], XR FLUOROSCOPY C-ARM  TIME< 1 HOUR (CPT=76000), 8/20/2020, 5:37 PM.  INDICATIONS: Left knee patella fracture malunion.   TECHNIQUE:  5 spot fluoroscopic images  FLUOROSCOPY EX technology. Please excuse any typos.     Heather Gaspar MD

## 2020-12-11 ENCOUNTER — OFFICE VISIT (OUTPATIENT)
Dept: ORTHOPEDICS CLINIC | Facility: CLINIC | Age: 70
End: 2020-12-11
Payer: MEDICARE

## 2020-12-11 ENCOUNTER — HOSPITAL ENCOUNTER (OUTPATIENT)
Dept: GENERAL RADIOLOGY | Facility: HOSPITAL | Age: 70
Discharge: HOME | End: 2020-12-11
Attending: ORTHOPAEDIC SURGERY
Payer: MEDICARE

## 2020-12-11 DIAGNOSIS — Z47.89 ORTHOPEDIC AFTERCARE: Primary | ICD-10-CM

## 2020-12-11 DIAGNOSIS — Z47.89 ORTHOPEDIC AFTERCARE: ICD-10-CM

## 2020-12-11 PROCEDURE — G0463 HOSPITAL OUTPT CLINIC VISIT: HCPCS | Performed by: ORTHOPAEDIC SURGERY

## 2020-12-11 PROCEDURE — 73560 X-RAY EXAM OF KNEE 1 OR 2: CPT | Performed by: ORTHOPAEDIC SURGERY

## 2020-12-11 PROCEDURE — 99024 POSTOP FOLLOW-UP VISIT: CPT | Performed by: ORTHOPAEDIC SURGERY

## 2020-12-11 NOTE — PROGRESS NOTES
NURSING INTAKE COMMENTS: Patient presents with:  Post-Op: s/p L knee hardware removal f/u - had sx on 10/15/2020 - states she is ok - has occasional little pains       HPI: This 78year old female presents today with complaints of routine follow-up status [place] for bilateral breast cancer   • OTHER      left patella x3 surgeries   • OTHER SURGICAL HISTORY  2020    left patella repair   • PATELLA OPEN REDUCTION INTERNAL FIXATION Left 10/15/2020    Performed by Charlene Garcia MD at [address] [place] [address]   • PA MG (80961 UT) Oral Cap TAKE 1 CAPSULE ONCE A WEEK 12 capsule 3   • BD PEN NEEDLE SHORT U/F 31G X 8 MM Does not apply Misc Use up to 5x Q day as directed.        No Known Allergies  Family History   Problem Relation Age of Onset   • Heart Disease Mother Component Value Date     (H) 08/23/2020    BUN 8 08/23/2020    CREATSERUM 0.68 08/23/2020    GFRNAA 91 08/23/2020    GFRAA 105 08/23/2020        Assessment and Plan:  Diagnoses and all orders for this visit:    Orthopedic aftercare  -     XR KNEE

## 2020-12-17 DIAGNOSIS — E10.65 TYPE 1 DIABETES MELLITUS WITH HYPERGLYCEMIA (HCC): Primary | ICD-10-CM

## 2020-12-21 RX ORDER — INSULIN GLARGINE 100 [IU]/ML
14 INJECTION, SOLUTION SUBCUTANEOUS NIGHTLY
Qty: 15 ML | Refills: 1 | Status: SHIPPED | OUTPATIENT
Start: 2020-12-21 | End: 2021-06-01

## 2021-01-21 RX ORDER — ERGOCALCIFEROL 1.25 MG/1
50000 CAPSULE ORAL WEEKLY
Qty: 13 CAPSULE | Refills: 3 | Status: SHIPPED | OUTPATIENT
Start: 2021-01-21

## 2021-02-01 ENCOUNTER — TELEMEDICINE (OUTPATIENT)
Dept: ENDOCRINOLOGY CLINIC | Facility: CLINIC | Age: 71
End: 2021-02-01

## 2021-02-01 DIAGNOSIS — E10.9 CONTROLLED DIABETES MELLITUS TYPE 1 WITHOUT COMPLICATIONS (HCC): Primary | ICD-10-CM

## 2021-02-01 PROCEDURE — 99213 OFFICE O/P EST LOW 20 MIN: CPT | Performed by: INTERNAL MEDICINE

## 2021-02-01 NOTE — PROGRESS NOTES
Please note that the following visit was completed using two-way, real-time interactive audio and video communication.   This has been done in good maral to provide continuity of care in the best interest of the provider-patient relationship, due to the mckenzie Anastrazole. Her oncologist mentioned prolia but stated that she didn't require therapy. She did receive one dose of Zometa from oncology approximately 18 months ago - plan was to dose twice per year.   Of note she has history of femur fracture several ye SUCCINATE ER 25 MG Oral Tablet 24 Hr, TAKE 1 TABLET DAILY (Patient taking differently: Take 25 mg by mouth daily.  ), Disp: 90 tablet, Rfl: 3  •  Insulin Pen Needle (BD PEN NEEDLE SHORT U/F) 31G X 8 MM Does not apply Misc, USE UP TO 5 TIMES EVERY DAY, Disp history of antineoplastic chemotherapy    • Type 1 diabetes mellitus ([facility] [address]     since age 17   • Visual impairment     readers       Surgical history:   Past Surgical History:   Procedure Laterality Date   • ANKLE FRACTURE SURGERY Right 08/2018    in [facility] materials  -Overall sugars are improved with CGM   -Continue Lantus 10 units SQ QHS  -Continue current dose of Humalog  -Normotensive    2.  Osteoporosis  -Discussed diagnosis with patient  -She was on Zometa previously but only received one dose  -She has

## 2021-02-03 DIAGNOSIS — Z23 NEED FOR VACCINATION: ICD-10-CM

## 2021-02-11 ENCOUNTER — IMMUNIZATION (OUTPATIENT)
Dept: LAB | Facility: HOSPITAL | Age: 71
End: 2021-02-11
Attending: HOSPITALIST
Payer: MEDICARE

## 2021-02-11 DIAGNOSIS — Z23 NEED FOR VACCINATION: Primary | ICD-10-CM

## 2021-02-11 PROCEDURE — 0011A SARSCOV2 VAC 100MCG/0.5ML IM: CPT

## 2021-03-11 ENCOUNTER — IMMUNIZATION (OUTPATIENT)
Dept: LAB | Facility: HOSPITAL | Age: 71
End: 2021-03-11
Attending: EMERGENCY MEDICINE
Payer: MEDICARE

## 2021-03-11 DIAGNOSIS — Z23 NEED FOR VACCINATION: Primary | ICD-10-CM

## 2021-03-11 PROCEDURE — 0012A SARSCOV2 VAC 100MCG/0.5ML IM: CPT

## 2021-04-19 RX ORDER — PEN NEEDLE, DIABETIC 31 GX5/16"
NEEDLE, DISPOSABLE MISCELLANEOUS
Qty: 450 EACH | Refills: 3 | Status: SHIPPED | OUTPATIENT
Start: 2021-04-19

## 2021-04-26 ENCOUNTER — OFFICE VISIT (OUTPATIENT)
Dept: ENDOCRINOLOGY CLINIC | Facility: CLINIC | Age: 71
End: 2021-04-26
Payer: MEDICARE

## 2021-04-26 ENCOUNTER — LAB ENCOUNTER (OUTPATIENT)
Dept: LAB | Facility: HOSPITAL | Age: 71
End: 2021-04-26
Attending: INTERNAL MEDICINE
Payer: MEDICARE

## 2021-04-26 VITALS
WEIGHT: 147 LBS | SYSTOLIC BLOOD PRESSURE: 138 MMHG | DIASTOLIC BLOOD PRESSURE: 75 MMHG | HEART RATE: 82 BPM | BODY MASS INDEX: 22 KG/M2

## 2021-04-26 DIAGNOSIS — E10.9 CONTROLLED DIABETES MELLITUS TYPE 1 WITHOUT COMPLICATIONS (HCC): Primary | ICD-10-CM

## 2021-04-26 DIAGNOSIS — E10.9 CONTROLLED DIABETES MELLITUS TYPE 1 WITHOUT COMPLICATIONS (HCC): ICD-10-CM

## 2021-04-26 PROCEDURE — 36415 COLL VENOUS BLD VENIPUNCTURE: CPT

## 2021-04-26 PROCEDURE — 95251 CONT GLUC MNTR ANALYSIS I&R: CPT | Performed by: INTERNAL MEDICINE

## 2021-04-26 PROCEDURE — 99214 OFFICE O/P EST MOD 30 MIN: CPT | Performed by: INTERNAL MEDICINE

## 2021-04-26 PROCEDURE — 83721 ASSAY OF BLOOD LIPOPROTEIN: CPT

## 2021-04-26 PROCEDURE — 83036 HEMOGLOBIN GLYCOSYLATED A1C: CPT

## 2021-04-26 PROCEDURE — 3075F SYST BP GE 130 - 139MM HG: CPT | Performed by: INTERNAL MEDICINE

## 2021-04-26 PROCEDURE — 85027 COMPLETE CBC AUTOMATED: CPT

## 2021-04-26 PROCEDURE — 3078F DIAST BP <80 MM HG: CPT | Performed by: INTERNAL MEDICINE

## 2021-04-26 PROCEDURE — 80053 COMPREHEN METABOLIC PANEL: CPT

## 2021-04-26 PROCEDURE — 82043 UR ALBUMIN QUANTITATIVE: CPT

## 2021-04-26 PROCEDURE — 82570 ASSAY OF URINE CREATININE: CPT

## 2021-04-26 NOTE — PROGRESS NOTES
Name: Nuvia Waddell  Date: 4/26/2021    HISTORY OF PRESENT ILLNESS   Nuvia Waddell is a 78year old female who presents for diabetes mellitus type 1 diagnosed at age 17.      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 She did not experience any severe hypoglycemia during the week of evaluation. As a result of her testing her medication was adjusted as noted above.      RTC 3 months     4/26/2021

## 2021-04-28 ENCOUNTER — TELEPHONE (OUTPATIENT)
Dept: ENDOCRINOLOGY CLINIC | Facility: CLINIC | Age: 71
End: 2021-04-28

## 2021-04-28 NOTE — TELEPHONE ENCOUNTER
Phoned [facility]  [phone], spoke with Al, he states Medicare is primary. I asked if Prolia requires Precert- he states yes and needs to go through Medicare side. States might do it Cover My Meds, and can also fax Prolia form from website.  He trans

## 2021-04-28 NOTE — TELEPHONE ENCOUNTER
[phone] - Per IVR member's plan [facility] Medicare Advantage) became effective 01/01/21.  Medicare     [employer] rep): patient has Medicare Advantage plan and access to patient's chart is limited but she was able to check if Prolia needs pre-ce

## 2021-05-03 ENCOUNTER — NURSE ONLY (OUTPATIENT)
Dept: ENDOCRINOLOGY CLINIC | Facility: CLINIC | Age: 71
End: 2021-05-03
Payer: MEDICARE

## 2021-05-03 DIAGNOSIS — M81.8 OTHER OSTEOPOROSIS WITHOUT CURRENT PATHOLOGICAL FRACTURE: Primary | ICD-10-CM

## 2021-05-03 PROCEDURE — 96372 THER/PROPH/DIAG INJ SC/IM: CPT | Performed by: INTERNAL MEDICINE

## 2021-05-03 NOTE — PROGRESS NOTES
Patient presents to the clinic for prolia injection. She received and tolerated Prolia 60 mg subcutaneously to the left upper arm. She was advised to make an appointment for next prolia injection on 11/3/21 and was given a reminder.   RN offered to make a

## 2021-05-11 RX ORDER — METOPROLOL SUCCINATE 25 MG/1
TABLET, EXTENDED RELEASE ORAL
Qty: 90 TABLET | Refills: 1 | Status: SHIPPED | OUTPATIENT
Start: 2021-05-11

## 2021-06-01 DIAGNOSIS — E10.65 TYPE 1 DIABETES MELLITUS WITH HYPERGLYCEMIA (HCC): ICD-10-CM

## 2021-06-02 RX ORDER — INSULIN GLARGINE 100 [IU]/ML
11 INJECTION, SOLUTION SUBCUTANEOUS NIGHTLY
Qty: 12 ML | Refills: 1 | Status: SHIPPED | OUTPATIENT
Start: 2021-06-02

## 2021-07-06 ENCOUNTER — TELEPHONE (OUTPATIENT)
Dept: INTERNAL MEDICINE CLINIC | Facility: CLINIC | Age: 71
End: 2021-07-06

## 2021-07-06 NOTE — TELEPHONE ENCOUNTER
Shailesh Gaines from the [facility] office called to speak with Dr. Leonard Ornelas. She states the pt. Passed away today at home at 9:28 this morning. Shailesh Gaines would like to speak with .  She can be reached at [phone].

## 2021-07-06 NOTE — TELEPHONE ENCOUNTER
I called Rose Calero at the [employer]. Her son came to check her b/c of well being check. He found her  in bed.  will sign death certificate as cardiac event as complications of diabetes and hypertension. I called her son Ezra Clark.    H

## 2021-10-05 ENCOUNTER — TELEPHONE (OUTPATIENT)
Dept: ENDOCRINOLOGY CLINIC | Facility: CLINIC | Age: 71
End: 2021-10-05

## 2021-10-05 NOTE — TELEPHONE ENCOUNTER
Last Prolia 05/03/21. Patient will be due for next prolia injection after 11/03/21. Per TE 04/28/21:  \"PA auto approved online for dates 4/28/2021-04/27/2022. \"    Called to schedule apt with [employer] in November. No answer, unable to leave message.  Sent M

## 2022-02-07 NOTE — TELEPHONE ENCOUNTER
Dexcom G6 CGM download reviewed.  The results revealed (10/21 to 11/3)     Average glucose: 142 mg/dl     In target range: (70-180mg/dl): 58%     High glucose targets: (> 180mg/dl): 17%     Very high glucose targets: (> 250mg/dl): 9%     Low glucose targets normal

## 2023-08-24 NOTE — ANESTHESIA POSTPROCEDURE EVALUATION
Patient: Humberto Lowry    Procedure Summary     Date:  09/11/20 Room / Location:  [facility]  / [facility]    Anesthesia Start:  1527 Anesthesia Stop:  5381    Procedure:  PATELLA OPEN REDUCTION INTERNAL FIXATION (Left Knee) Diagnosis:  (left patella f ambulatory

## 2023-10-29 NOTE — PROGRESS NOTES
Gerber Gomez is a 81year old female who presents for     4-month check    Type I diabetic - Had a phone visit with Dr. Nicholas Johnson 4/27/20. A1c 6.0 on 10/24/19--recent A1c 4.7 on 7/1/20. on Lantus 11 units daily & Humalog 2-5 units AC. (pt cut back some). Onset   • Heart Disease Mother          age 80 CHF   • Colon Cancer Mother 66   • Other (parkinsons disease) Father          early [de-identified].    • Other (1 brother, 1 son, 2 daughters) Other       Social History:   Social History    Tobacco Use      Smoki daily.    Hypercholesterolemia-–Lipitor 10 mg held since 7/2019 and no change in LFTs. LDL 69 on 7/1/20 (off lipitor)--controlled off med. Elevated LFTs-fatty liver as possible etiology--gallstones asymptomatic.       Lab 7/1/20–ALT 77 --compares lipid– ALT 77  LDL 69 . [facility]. PTH and vitamin D bone specific alk phos–ok.     Ret in 4 mo with cbc cmp tsh [facility] ma. (Dr Vidhi Carey does A1c). Patient expresses understanding of above issues and plan.    This is a 22 Pako Zamora MD  7/6/20 DISPLAY PLAN FREE TEXT DISPLAY PLAN FREE TEXT DISPLAY PLAN FREE TEXT DISPLAY PLAN FREE TEXT

## (undated) DEVICE — SUTURE MONOCRYL 3-0 Y936H

## (undated) DEVICE — CLIPPER BLADE 3M

## (undated) DEVICE — SOL  .9 1000ML BTL

## (undated) DEVICE — GOWN SURG AERO BLUE PERF LG

## (undated) DEVICE — DRAPE C-ARM UNIVERSAL

## (undated) DEVICE — DRAPE SRG 70X60IN SPLT U IMPRV

## (undated) DEVICE — TOURNIQUET CUFF 34 STR

## (undated) DEVICE — BATTERY

## (undated) DEVICE — LOWER EXTREMITY: Brand: MEDLINE INDUSTRIES, INC.

## (undated) DEVICE — OCCLUSIVE GAUZE STRIP,3% BISMUTH TRIBROMOPHENATE IN PETROLATUM BLEND: Brand: XEROFORM

## (undated) DEVICE — SUCTION CANISTER, 3000CC,SAFELINER: Brand: DEROYAL

## (undated) DEVICE — CHLORAPREP 26ML APPLICATOR

## (undated) DEVICE — DRESSING AQUACEL AG 3.5 X 6

## (undated) DEVICE — ENCORE® LATEX MICRO SIZE 8.5, STERILE LATEX POWDER-FREE SURGICAL GLOVE: Brand: ENCORE

## (undated) DEVICE — 3M™ STERI-STRIP™ REINFORCED ADHESIVE SKIN CLOSURES, R1548, 1 IN X 5 IN (25 MM X 125 MM), 4 STRIPS/ENVELOPE: Brand: 3M™ STERI-STRIP™

## (undated) DEVICE — PROXIMATE SKIN STAPLERS (35 WIDE) CONTAINS 35 STAINLESS STEEL STAPLES (FIXED HEAD): Brand: PROXIMATE

## (undated) DEVICE — DRILL BIT ZIM 2.0 4806-100-20

## (undated) DEVICE — SUTURE VICRYL 2-0 FS-1

## (undated) DEVICE — TETRA-FLEX CF WOVEN LATEX FREE ELASTIC BANDAGE 6" X 5.5 YD: Brand: TETRA-FLEX™CF

## (undated) DEVICE — ENCORE® LATEX ACCLAIM SIZE 8.5, STERILE LATEX POWDER-FREE SURGICAL GLOVE: Brand: ENCORE

## (undated) DEVICE — DRAPE SHEET LG

## (undated) DEVICE — Device

## (undated) DEVICE — C-ARMOR C-ARM EQUIPMENT COVERS CLEAR STERILE UNIVERSAL FIT 12 PER CASE: Brand: C-ARMOR

## (undated) DEVICE — INSTRUMENT 875-088 14MM DSTRCT PIN STRL: Brand: MEDTRONIC REUSABLE INSTRUMENT

## (undated) DEVICE — SOL H2O 1000ML BTL

## (undated) DEVICE — SUTURE FIBERWIRE S AR-7200

## (undated) DEVICE — DERMABOND LIQUID ADHESIVE

## (undated) DEVICE — ZIMMER® STERILE DISPOSABLE TOURNIQUET CUFF WITH PLC, DUAL PORT, SINGLE BLADDER, 30 IN. (76 CM)

## (undated) DEVICE — C-ARM: Brand: UNBRANDED

## (undated) DEVICE — INTENDED FOR TISSUE SEPARATION, AND OTHER PROCEDURES THAT REQUIRE A SHARP SURGICAL BLADE TO PUNCTURE OR CUT.: Brand: BARD-PARKER ® STAINLESS STEEL BLADES

## (undated) DEVICE — COTTON UNDERCAST PADDING,REGULAR FINISH: Brand: WEBRIL

## (undated) DEVICE — DRESSING 10X4IN ANMC SAFETAC

## (undated) NOTE — IP AVS SNAPSHOT
[facility]            (For Outpatient Use Only) Initial Admit Date: 8/20/2020   Inpt/Obs Admit Date: Inpt: N/A / Obs: N/A   Discharge Date:    Hospital Acct:  [de-identified]   MRN: [de-identified]   CSN: [id number]   CEID: [id number]        ENCOUNT Group Number:  Insurance Type:    Subscriber Name:  Subscriber :    Subscriber ID:  Pt Rel to Subscriber:    Hospital Account Financial Class: Medicare    2020

## (undated) NOTE — LETTER
Hospital Discharge Documentation  Patient was discharged to [facility]; [phone].      From: [address] Hospitalist's Office  Phone: [phone]    Patient discharged time/date: 8/24/2020  4:22 PM  Patient discharge disposition:  SNF or guarding. Neurologic: No focal neurological deficits. Musculoskeletal: Moves all extremities.     Hospital Course:   Left patella fracture  S/P left patellar fracture open reduction internal fixation, ASA DVT PROPHYLAXIS, PT/OT- will discharge to [facility] Inject 1 mL (1,000 mcg total) into the skin every 30 (thirty) days.     Levothyroxine Sodium 100 MCG Oral Tab  TAKE 1 TABLET 6 DAYS A WEEK    METOPROLOL SUCCINATE ER 25 MG Oral Tablet 24 Hr  TAKE 1 TABLET DAILY    ERGOCALCIFEROL 1.25 MG (28430 UT) Oral Cap Quantity:  30 tablet  Refills:  0        CHANGE how you take these medications      Instructions Prescription details   OneTouch Verio Strp  What changed:  additional instructions      Test blood sugar 4 times daily.    Quantity:  400 strip  Refills:  3 Commonly known as:  ULTRAM      Take 1 tablet (50 mg total) by mouth every 6 (six) hours as needed.    Quantity:  12 tablet  Refills:  0           Where to Get Your Medications      These medications were sent to [address] [] You may remove your dressing 72 hours after your surgery. You may then shower and leave the incisions open to the air or apply a dry dressing.   [x] You should leave your dressing on 1 week from the date of surgery and you can shower with your dressing

## (undated) NOTE — IP AVS SNAPSHOT
Patient Demographics     Address  [address] [place] [address] [address] Phone  [phone] [facility])  [phone] (Mobile) *Preferred* E-mail Address  [email or web address]      Emergency Contact(s)     Name Relation Home Work [address] ? Numbness / tingling may still be present near your incision and are generally not concerning.     Activity:  [x] Elevate your knee to the level of your heart as often as possible.  [] Work on maintaining your knee extension, starting immediately after rachna Take 1 tablet (81 mg total) by mouth 2 (two) times daily with meals. Start dinner tonight then breakfast/dinner for 12 days.    Cecile Toribio MD         BD Pen Needle Short U/F 31G X 8 MM Misc  Generic drug:  Insulin Pen Needle      Use up to 5x Q da traMADol HCl 50 MG Tabs  Commonly known as:  ULTRAM      Take 1 tablet (50 mg total) by mouth every 6 (six) hours as needed.    DIANA Goel         traMADol HCl 50 MG Tabs  Commonly known as:  ULTRAM      Take 1 tablet (50 mg total) by mouth every 6 Order ID Medication Name Action Time Action Reason Comments    [id number] Insulin Aspart Pen (NOVOLOG) 100 UNIT/ML flexpen 3 Units 08/23/20 1709 Given            LEFT UPPER ABDOMEN     Order ID Medication Name Action Time Action Reason Comments    [id number] Author:  Karan Vargas MD Service:  Internal Medicine Author Type:  Physician    Filed:  8/24/2020  1:13 PM Date of Service:  8/24/2020  1:10 PM Status:  Signed    :  Karan Vargas MD (Physician)       [facility]    Discharge Sum   Type 1 diabetes ([facility] [address]  Patient was hypoglycemic   Was getting sliding scale and 3 units with meals  Levemir 11 units at night- will resume   Now resolved       Hypothyroidism (acquired)  CONT HOME MEDS.         Hypertension, essential  CONT HOME MEDS, MON Inject 11 Units into the skin nightly. anastrozole 1 MG Oral Tab tab  Take 1 tablet (1 mg total) by mouth daily. Glucose Blood (ONETOUCH VERIO) In Vitro Strip  Test blood sugar 4 times daily.     !! traMADol HCl 50 MG Oral Tab  Take 1 tablet (50 mg to Commonly known as:  ARIMIDEX      Take 1 tablet (1 mg total) by mouth daily. Quantity:  90 tablet  Refills:  3     BD Pen Needle Short U/F 31G X 8 MM Misc  Generic drug:  Insulin Pen Needle      Use up to 5x Q day as directed.    Refills:  0     Insulin P Phone:  [phone]   · aspirin 81 MG Tbec  · HYDROcodone-acetaminophen 5-325 MG Tabs     Please  your prescriptions at the location directed by your doctor or nurse    Bring a paper prescription for each of these medications  · aspirin 81 MG Tbec Remove the dressing at 1 week and leave the incision open to the air of apply a dry dressing. ? You may take showers after dressing removal.  Don't scrub your wound and pat dry.   ? Do not immerse your wound in water for at least 4 weeks after sutures are Signed    :  Olivia Ware PTA (Physical Therapist)       PHYSICAL THERAPY TREATMENT NOTE - INPATIENT     Room Number: 415/415-A[DK.1]       Presenting Problem: s/p L patella ORIF (fx from a fall at home)[DK.2]    Problem List[DK. 1]  Principal Pr Static Standing: Fair  Dynamic Standing: Fair -[DK. 2]    ACTIVITY TOLERANCE[DK.1]  Pulse: 85  Heart Rate Source: Monitor  Resp: 16  BP: 110/50  BP Location: Right arm  BP Method: Automatic  Patient Position: Lying[DK. 2]    O2 WALK[DK. 1]  SPO2 on Room Air a Goal #2 Patient is able to demonstrate transfers Sit to/from Stand at assistance level: supervision with walker - rolling     Goal #2  Current Status Min A    Goal #3 Patient is able to ambulate 25 feet with assist device: walker - rolling at assistance le The patient's Approx Degree of Impairment: 54.16% has been calculated based on documentation in the [facility] '6 clicks' Inpatient Basic Mobility Short Form. Research supports that patients with this level of impairment may benefit from subacute.     DISCHARGE Assistive Device: Rolling walker  Pattern: L Decreased stance time; Shuffle  Stoop/Curb Assistance: Not tested  Comment : stood for 90 sec with Min a    Additional information:     THERAPEUTIC EXERCISES  Lower Extremity Alternating marching  Ankle pumps  He Reason for Therapy: Mobility Dysfunction and Discharge Planning    PHYSICAL THERAPY ASSESSMENT     Patient is a 81year old female admitted 8/20/2020 for s/p L patella ORIF (fx from a fall at home).   Patient's current functional deficits include decreased DISCHARGE RECOMMENDATIONS  PT Discharge Recommendations: Sub-acute rehabilitation    PLAN  PT Treatment Plan: Bed mobility; Endurance; Patient education;Gait training;Strengthening;Stair training;Transfer training;Balance training  Rehab Potential : Good  Fr in [place] for bilateral breast cancer       HOME SITUATION  Type of Home: Apartment   Home Layout: One level  Stairs to Enter : 0             Lives With: Alone  Drives: No  Patient Owned Equipment: Cane(rollator; w/c)  Patient Regularly Uses: Glasses -   Need to walk in hospital room?: A Lot   -   Climbing 3-5 steps with a railing?: A Lot     AM-PAC Score:  Raw Score: 16   Approx Degree of Impairment: 54.16%   Standardized Score (AM-PAC Scale): 40.78   CMS Modifier (G-Code): CK    FUNCTIONAL ABILITY ST No notes of this type exist for this encounter. SLP Notes    No notes of this type exist for this encounter.      Immunizations     Name Date      INFLUENZA 10/30/19     INFLUENZA 11/02/17     INFLUENZA 10/01/16     Pneumococcal (Prevnar 13) 10/30/19

## (undated) NOTE — LETTER
Hospital Discharge Documentation  Patient was discharged to [facility] [phone].     From: [address] Hospitalist's Office  Phone: [phone]    Patient discharged time/date: 9/14/2020  5:14 PM  Patient discharge disposition:  SNF Patient understands return to the emergency room for increased pain, fever, discharge, shortness of breath, chest pain, new neurologic symptoms, other concerning symptoms.     PHYSICAL EXAM:  Temp:  [98.1 °F (36.7 °C)-98.6 °F (37 °C)] 98.6 °F (37 °C)  Pul Refills:  3     Dexcom G6 Sensor Misc      Use as directed, change sensors every 10 days. Quantity:  3 each  Refills:  3     docusate sodium 100 MG Caps  Commonly known as:  COLACE      Take 100 mg by mouth 2 (two) times daily as needed for constipation. 59-90 High Risk  29-58 Medium Risk  0-28   Low Risk. TCM Follow-Up Recommendation:  LACE 29-58:  Moderate Risk of readmission after discharge from the hospital.[WW.1]              Electronically signed by Arabella Morgan MD on 9/14/2020 12:22 PM   Attrib[employer]

## (undated) NOTE — IP AVS SNAPSHOT
[facility]            (For Outpatient Use Only) Initial Admit Date: 9/11/2020   Inpt/Obs Admit Date: Inpt: 9/11/20 / Obs: N/A   Discharge Date:    Ale Copier:  [de-identified]   MRN: [de-identified]   CSN: [id number]   CEID: [id number]        SHR Payor:  Plan:    Group Number:  Insurance Type:    Subscriber Name:  Subscriber :    Subscriber ID:  Pt Rel to Subscriber:    Hospital Account Financial Class: Medicare    2020

## (undated) NOTE — IP AVS SNAPSHOT
Patient Demographics     Address  [address] [place] [address] [facility] [address] Phone  [phone] [facility])  [phone] (Mobile) *Preferred* E-mail Address  [email or web address]      Emergency Contact(s)     Name Relation Home Work [facility] Call to schedule a follow-up appointment in 2 weeks, if not already done. [facility]: [phone]  [facility] Office: Angelika Torres MD In 2 weeks.     Specialty:  SURGERY, ORTH HYDROcodone-acetaminophen 5-325 MG Tabs  Commonly known as:  NORCO      Take 1 tablet by mouth every 4 (four) hours as needed.    Radha Hammer MD         HYDROcodone-acetaminophen 5-325 MG Tabs  Commonly known as:  NORCO      Take 1 tablet by mouth every 6 46 Given            LEFT UPPER ARM     Order ID Medication Name Action Time Action Reason Comments    [id number] Insulin Aspart Pen (NOVOLOG) 100 UNIT/ML flexpen 1-11 Units 09/14/20 1339 Given  Dose approved by Dr. Gus Oakley telephone with readback.           R No notes of this type exist for this encounter.         Discharge Summary - D/C Summary      Discharge Summary signed by Min Ruby MD at 9/14/2020 12:22 PM  Version 1 of 1    Author:  Min Ruby MD Service:  Hospitalist Author Type:  Physician    F Temp:  [98.1 °F (36.7 °C)-98.6 °F (37 °C)] 98.6 °F (37 °C)  Pulse:  [85-90] 90  Resp:  [18] 18  BP: ()/(48-61) 124/61  Gen: A+Ox3. No distress. HEENT: NCAT, neck supple, no carotid bruit. CV: RRR, S1S2, and intact distal pulses.  No gallop, rub, m Take 100 mg by mouth 2 (two) times daily as needed for constipation.    Refills:  0     ergocalciferol 1.25 MG (53324 UT) Caps  Commonly known as:  DRISDOL/VITAMIN D2      TAKE 1 CAPSULE ONCE A WEEK   Quantity:  12 capsule  Refills:  3     HumaLOG KwikPen Electronically signed by Leopoldo Bruns, MD on 9/14/2020 12:22 PM   Attribution Key    [email or web address] - Leopoldo Bruns, MD on 9/14/2020 12:21 PM                        Physical Therapy Notes (last 72 hours) (Notes from 9/11/2020  4:51 PM through 9/14/2020  4:51 PM) DISCHARGE RECOMMENDATIONS  PT Discharge Recommendations: Sub-acute rehabilitation     PLAN  PT Treatment Plan: Bed mobility; Body mechanics; Endurance; Patient education;Gait training;Strengthening;Transfer training;Balance training    SUBJECTIVE  Pt was agre Patient End of Session: Needs met;Call light within reach;RN aware of session/findings; In bed; All patient questions and concerns addressed    CURRENT GOALS     Goals to be met by: 9/19/2020  Patient Goal Patient's self-stated goal is: get better   Goal #1 Signed    :  Sheri Salvador PT (Physical Therapist)        PHYSICAL THERAPY EVALUATION - INPATIENT     Room Number: 405/405-A  Evaluation Date: 9/12/2020  Type of Evaluation: Initial   Physician Order: PT Eval and Treat    Presenting Problem: L pa training;Strengthening;Transfer training  Rehab Potential : Good  Frequency (Obs): Daily       PHYSICAL THERAPY MEDICAL/SOCIAL HISTORY     History related to current admission: admitted from [facility], fx not healing     Problem List  Principal Problem: Performed by Anabelle Currie MD at [address] [place] [address]  Type of Home: Apartment(senior apartment)   Home Layout: One level  Stairs to Enter : 0     Stairs to Bedroom: 0       Lives With: Alone  Drives: No  Patient Owned Equipment: ([place] [employer] -   Need to walk in hospital room?: A Little   -   Climbing 3-5 steps with a railing?: A Lot     AM-PAC Score:  Raw Score: 17   Approx Degree of Impairment: 50.57%   Standardized Score (AM-PAC Scale): 42.13   CMS Modifier (G-Code): CK    FUNCTIONAL ABILITY Author:  Alka Pak OT Service:  Radu Vickers Author Type:  Occupational Therapist    Filed:  9/12/2020 12:47 PM Date of Service:  9/12/2020  8:40 AM Status:  Signed    :  Alka Pak OT (Occupational Therapist)       OCCUPATIONAL THERAPY EVALUATION - I inpatient OT to address the above deficits, maximizing patient's ability to return to prior level of function.     The patient's Approx Degree of Impairment: 53.32% has been calculated based on documentation in the [facility] '6 clicks' Inpatient Daily Activity Past Surgical History  Past Surgical History:   Procedure Laterality Date   • ANKLE FRACTURE SURGERY Right 08/2018    in [place]. (plate placed).  Dr Emily Fan in [address] [facility] Bilateral 2016    Dr Guero Trevizo AM-PAC ‘6-Clicks’ Inpatient Daily Activity Short Form  How much help from another person does the patient currently need…  -   Putting on and taking off regular lower body clothing?: A Little  -   Bathing (including washing, rinsing, drying)?: A Lot  -   T No notes of this type exist for this encounter.      Immunizations     Name Date      INFLUENZA 10/30/19     INFLUENZA 11/02/17     INFLUENZA 10/01/16     Pneumococcal (Prevnar 13) 10/30/19     Pneumovax 23 09/01/17     TDAP 10/01/17       Future Appointmen

## (undated) NOTE — LETTER
5/27/2020              [address]         Dear Carmine Mckenzie,    [address] records indicate that the tests ordered for you by Eduardo Castañeda MD  have not been done.   If you have, in fact, already